# Patient Record
Sex: MALE | Race: WHITE | NOT HISPANIC OR LATINO | Employment: FULL TIME | URBAN - METROPOLITAN AREA
[De-identification: names, ages, dates, MRNs, and addresses within clinical notes are randomized per-mention and may not be internally consistent; named-entity substitution may affect disease eponyms.]

---

## 2017-01-05 ENCOUNTER — ALLSCRIPTS OFFICE VISIT (OUTPATIENT)
Dept: OTHER | Facility: OTHER | Age: 57
End: 2017-01-05

## 2017-01-05 DIAGNOSIS — R97.20 ELEVATED PROSTATE SPECIFIC ANTIGEN (PSA): ICD-10-CM

## 2017-01-05 LAB
BILIRUB UR QL STRIP: NORMAL
CLARITY UR: NORMAL
COLOR UR: YELLOW
GLUCOSE (HISTORICAL): NORMAL
HGB UR QL STRIP.AUTO: NORMAL
KETONES UR STRIP-MCNC: NORMAL MG/DL
LEUKOCYTE ESTERASE UR QL STRIP: NORMAL
NITRITE UR QL STRIP: NORMAL
PH UR STRIP.AUTO: 5 [PH]
PROT UR STRIP-MCNC: NORMAL MG/DL
SP GR UR STRIP.AUTO: 1.02
UROBILINOGEN UR QL STRIP.AUTO: NORMAL

## 2017-02-02 ENCOUNTER — LAB CONVERSION - ENCOUNTER (OUTPATIENT)
Dept: OTHER | Facility: OTHER | Age: 57
End: 2017-02-02

## 2017-02-02 LAB — PROSTATE SPECIFIC ANTIGEN TOTAL (HISTORICAL): 0.8 NG/ML

## 2017-06-09 ENCOUNTER — ALLSCRIPTS OFFICE VISIT (OUTPATIENT)
Dept: OTHER | Facility: OTHER | Age: 57
End: 2017-06-09

## 2017-06-16 ENCOUNTER — ALLSCRIPTS OFFICE VISIT (OUTPATIENT)
Dept: RADIOLOGY | Facility: CLINIC | Age: 57
End: 2017-06-16
Payer: COMMERCIAL

## 2017-06-23 ENCOUNTER — GENERIC CONVERSION - ENCOUNTER (OUTPATIENT)
Dept: OTHER | Facility: OTHER | Age: 57
End: 2017-06-23

## 2017-07-12 ENCOUNTER — ALLSCRIPTS OFFICE VISIT (OUTPATIENT)
Dept: OTHER | Facility: OTHER | Age: 57
End: 2017-07-12

## 2017-07-27 ENCOUNTER — GENERIC CONVERSION - ENCOUNTER (OUTPATIENT)
Dept: OTHER | Facility: OTHER | Age: 57
End: 2017-07-27

## 2017-09-27 ENCOUNTER — GENERIC CONVERSION - ENCOUNTER (OUTPATIENT)
Dept: OTHER | Facility: OTHER | Age: 57
End: 2017-09-27

## 2017-09-28 ENCOUNTER — LAB (OUTPATIENT)
Dept: LAB | Facility: HOSPITAL | Age: 57
End: 2017-09-28
Attending: ORTHOPAEDIC SURGERY
Payer: COMMERCIAL

## 2017-09-28 ENCOUNTER — HOSPITAL ENCOUNTER (OUTPATIENT)
Dept: RADIOLOGY | Facility: HOSPITAL | Age: 57
Discharge: HOME | End: 2017-09-28
Attending: ORTHOPAEDIC SURGERY
Payer: COMMERCIAL

## 2017-09-28 ENCOUNTER — TRANSCRIBE ORDERS (OUTPATIENT)
Dept: RADIOLOGY | Facility: HOSPITAL | Age: 57
End: 2017-09-28

## 2017-09-28 ENCOUNTER — TRANSCRIBE ORDERS (OUTPATIENT)
Dept: LAB | Facility: HOSPITAL | Age: 57
End: 2017-09-28

## 2017-09-28 DIAGNOSIS — M48.061 LUMBAR STENOSIS: ICD-10-CM

## 2017-09-28 DIAGNOSIS — M48.061 LUMBAR STENOSIS: Primary | ICD-10-CM

## 2017-09-28 LAB
ALBUMIN SERPL BCP-MCNC: 3.9 G/DL (ref 3.5–5)
ALP SERPL-CCNC: 77 U/L (ref 46–116)
ALT SERPL W P-5'-P-CCNC: 28 U/L (ref 12–78)
ANION GAP SERPL CALCULATED.3IONS-SCNC: 10 MMOL/L (ref 4–13)
APTT PPP: 29 SECONDS (ref 23–35)
AST SERPL W P-5'-P-CCNC: 25 U/L (ref 5–45)
ATRIAL RATE: 70 BPM
BASOPHILS # BLD AUTO: 0.01 THOUSANDS/ΜL (ref 0–0.1)
BASOPHILS NFR BLD AUTO: 0 % (ref 0–1)
BILIRUB SERPL-MCNC: 0.46 MG/DL (ref 0.2–1)
BILIRUB UR QL STRIP: NEGATIVE
BUN SERPL-MCNC: 17 MG/DL (ref 5–25)
CALCIUM SERPL-MCNC: 8.8 MG/DL (ref 8.3–10.1)
CHLORIDE SERPL-SCNC: 111 MMOL/L (ref 100–108)
CLARITY UR: CLEAR
CO2 SERPL-SCNC: 22 MMOL/L (ref 21–32)
COLOR UR: YELLOW
CREAT SERPL-MCNC: 0.88 MG/DL (ref 0.6–1.3)
EOSINOPHIL # BLD AUTO: 0.1 THOUSAND/ΜL (ref 0–0.61)
EOSINOPHIL NFR BLD AUTO: 1 % (ref 0–6)
ERYTHROCYTE [DISTWIDTH] IN BLOOD BY AUTOMATED COUNT: 12.9 % (ref 11.6–15.1)
GFR SERPL CREATININE-BSD FRML MDRD: 94 ML/MIN/1.73SQ M
GLUCOSE P FAST SERPL-MCNC: 74 MG/DL (ref 65–99)
GLUCOSE UR STRIP-MCNC: NEGATIVE MG/DL
HCT VFR BLD AUTO: 42.9 % (ref 36.5–49.3)
HGB BLD-MCNC: 15.7 G/DL (ref 12–17)
HGB UR QL STRIP.AUTO: NEGATIVE
INR PPP: 1.02 (ref 0.86–1.16)
KETONES UR STRIP-MCNC: NEGATIVE MG/DL
LEUKOCYTE ESTERASE UR QL STRIP: NEGATIVE
LYMPHOCYTES # BLD AUTO: 1.89 THOUSANDS/ΜL (ref 0.6–4.47)
LYMPHOCYTES NFR BLD AUTO: 25 % (ref 14–44)
MCH RBC QN AUTO: 33.8 PG (ref 26.8–34.3)
MCHC RBC AUTO-ENTMCNC: 36.6 G/DL (ref 31.4–37.4)
MCV RBC AUTO: 92 FL (ref 82–98)
MONOCYTES # BLD AUTO: 0.72 THOUSAND/ΜL (ref 0.17–1.22)
MONOCYTES NFR BLD AUTO: 10 % (ref 4–12)
NEUTROPHILS # BLD AUTO: 4.71 THOUSANDS/ΜL (ref 1.85–7.62)
NEUTS SEG NFR BLD AUTO: 64 % (ref 43–75)
NITRITE UR QL STRIP: NEGATIVE
NRBC BLD AUTO-RTO: 0 /100 WBCS
P AXIS: 55 DEGREES
PH UR STRIP.AUTO: 5.5 [PH] (ref 4.5–8)
PLATELET # BLD AUTO: 249 THOUSANDS/UL (ref 149–390)
PMV BLD AUTO: 11 FL (ref 8.9–12.7)
POTASSIUM SERPL-SCNC: 3.9 MMOL/L (ref 3.5–5.3)
PR INTERVAL: 142 MS
PROT SERPL-MCNC: 7.6 G/DL (ref 6.4–8.2)
PROT UR STRIP-MCNC: NEGATIVE MG/DL
PROTHROMBIN TIME: 13.4 SECONDS (ref 12.1–14.4)
QRS AXIS: 67 DEGREES
QRSD INTERVAL: 86 MS
QT INTERVAL: 396 MS
QTC INTERVAL: 427 MS
RBC # BLD AUTO: 4.65 MILLION/UL (ref 3.88–5.62)
SODIUM SERPL-SCNC: 143 MMOL/L (ref 136–145)
SP GR UR STRIP.AUTO: 1.03 (ref 1–1.03)
T WAVE AXIS: 54 DEGREES
UROBILINOGEN UR QL STRIP.AUTO: 0.2 E.U./DL
VENTRICULAR RATE: 70 BPM
WBC # BLD AUTO: 7.44 THOUSAND/UL (ref 4.31–10.16)

## 2017-09-28 PROCEDURE — 81003 URINALYSIS AUTO W/O SCOPE: CPT | Performed by: ORTHOPAEDIC SURGERY

## 2017-09-28 PROCEDURE — 71020 HB CHEST X-RAY 2VW FRONTAL&LATL: CPT

## 2017-09-28 PROCEDURE — 86850 RBC ANTIBODY SCREEN: CPT

## 2017-09-28 PROCEDURE — 80053 COMPREHEN METABOLIC PANEL: CPT

## 2017-09-28 PROCEDURE — 93005 ELECTROCARDIOGRAM TRACING: CPT

## 2017-09-28 PROCEDURE — 86900 BLOOD TYPING SEROLOGIC ABO: CPT

## 2017-09-28 PROCEDURE — 86901 BLOOD TYPING SEROLOGIC RH(D): CPT

## 2017-09-28 PROCEDURE — 85610 PROTHROMBIN TIME: CPT

## 2017-09-28 PROCEDURE — 85730 THROMBOPLASTIN TIME PARTIAL: CPT

## 2017-09-28 PROCEDURE — 36415 COLL VENOUS BLD VENIPUNCTURE: CPT

## 2017-09-28 PROCEDURE — 85025 COMPLETE CBC W/AUTO DIFF WBC: CPT

## 2017-09-29 LAB
ABO GROUP BLD: NORMAL
BLD GP AB SCN SERPL QL: NEGATIVE
RH BLD: POSITIVE
SPECIMEN EXPIRATION DATE: NORMAL

## 2017-10-09 RX ORDER — IBUPROFEN 200 MG
TABLET ORAL DAILY PRN
COMMUNITY
End: 2019-10-31

## 2017-10-09 RX ORDER — VALSARTAN 320 MG/1
320 TABLET ORAL DAILY
COMMUNITY

## 2017-10-09 NOTE — PRE-PROCEDURE INSTRUCTIONS
Pre-Surgery Instructions:   Medication Instructions    ibuprofen (MOTRIN) 200 mg tablet Patient was instructed by Physician and understands   valsartan (DIOVAN) 320 MG tablet Instructed patient per Anesthesia Guidelines  REVIEWED  PRINTED SURGICAL INSTRUCTIONS WITH PATIENT , PATIENT VERBALIZED UNDERSTANDING   MEDICATIONS REVIEWED

## 2017-10-10 ENCOUNTER — APPOINTMENT (OUTPATIENT)
Dept: RADIOLOGY | Facility: HOSPITAL | Age: 57
End: 2017-10-10
Payer: COMMERCIAL

## 2017-10-10 ENCOUNTER — ANESTHESIA EVENT (OUTPATIENT)
Dept: PERIOP | Facility: HOSPITAL | Age: 57
End: 2017-10-10
Payer: COMMERCIAL

## 2017-10-10 ENCOUNTER — HOSPITAL ENCOUNTER (OUTPATIENT)
Facility: HOSPITAL | Age: 57
Discharge: HOME | End: 2017-10-11
Attending: ORTHOPAEDIC SURGERY | Admitting: ORTHOPAEDIC SURGERY
Payer: COMMERCIAL

## 2017-10-10 ENCOUNTER — ANESTHESIA (OUTPATIENT)
Dept: PERIOP | Facility: HOSPITAL | Age: 57
End: 2017-10-10
Payer: COMMERCIAL

## 2017-10-10 DIAGNOSIS — M51.9 LUMBAR DISC DISEASE: Primary | ICD-10-CM

## 2017-10-10 PROCEDURE — 72100 X-RAY EXAM L-S SPINE 2/3 VWS: CPT

## 2017-10-10 PROCEDURE — C1765 ADHESION BARRIER: HCPCS | Performed by: ORTHOPAEDIC SURGERY

## 2017-10-10 RX ORDER — VALSARTAN 160 MG/1
320 TABLET ORAL DAILY
Status: DISCONTINUED | OUTPATIENT
Start: 2017-10-10 | End: 2017-10-11 | Stop reason: HOSPADM

## 2017-10-10 RX ORDER — TRAMADOL HYDROCHLORIDE 50 MG/1
50 TABLET ORAL EVERY 6 HOURS PRN
Status: DISCONTINUED | OUTPATIENT
Start: 2017-10-10 | End: 2017-10-11 | Stop reason: HOSPADM

## 2017-10-10 RX ORDER — ROCURONIUM BROMIDE 10 MG/ML
INJECTION, SOLUTION INTRAVENOUS AS NEEDED
Status: DISCONTINUED | OUTPATIENT
Start: 2017-10-10 | End: 2017-10-10 | Stop reason: SURG

## 2017-10-10 RX ORDER — SODIUM CHLORIDE, SODIUM LACTATE, POTASSIUM CHLORIDE, CALCIUM CHLORIDE 600; 310; 30; 20 MG/100ML; MG/100ML; MG/100ML; MG/100ML
INJECTION, SOLUTION INTRAVENOUS CONTINUOUS PRN
Status: DISCONTINUED | OUTPATIENT
Start: 2017-10-10 | End: 2017-10-10 | Stop reason: SURG

## 2017-10-10 RX ORDER — EPHEDRINE SULFATE 50 MG/ML
INJECTION, SOLUTION INTRAVENOUS AS NEEDED
Status: DISCONTINUED | OUTPATIENT
Start: 2017-10-10 | End: 2017-10-10 | Stop reason: SURG

## 2017-10-10 RX ORDER — TRAMADOL HYDROCHLORIDE 50 MG/1
50 TABLET ORAL EVERY 6 HOURS SCHEDULED
Status: DISCONTINUED | OUTPATIENT
Start: 2017-10-10 | End: 2017-10-10

## 2017-10-10 RX ORDER — METHOCARBAMOL 500 MG/1
500 TABLET, FILM COATED ORAL 4 TIMES DAILY
Qty: 30 TABLET | Refills: 0 | Status: SHIPPED | OUTPATIENT
Start: 2017-10-10 | End: 2019-09-26 | Stop reason: SDUPTHER

## 2017-10-10 RX ORDER — FENTANYL CITRATE 50 UG/ML
INJECTION, SOLUTION INTRAMUSCULAR; INTRAVENOUS AS NEEDED
Status: DISCONTINUED | OUTPATIENT
Start: 2017-10-10 | End: 2017-10-10 | Stop reason: SURG

## 2017-10-10 RX ORDER — LIDOCAINE HYDROCHLORIDE 10 MG/ML
INJECTION, SOLUTION INFILTRATION; PERINEURAL AS NEEDED
Status: DISCONTINUED | OUTPATIENT
Start: 2017-10-10 | End: 2017-10-10 | Stop reason: SURG

## 2017-10-10 RX ORDER — SODIUM CHLORIDE 9 MG/ML
125 INJECTION, SOLUTION INTRAVENOUS CONTINUOUS
Status: DISPENSED | OUTPATIENT
Start: 2017-10-10 | End: 2017-10-11

## 2017-10-10 RX ORDER — ALBUMIN, HUMAN INJ 5% 5 %
SOLUTION INTRAVENOUS CONTINUOUS PRN
Status: DISCONTINUED | OUTPATIENT
Start: 2017-10-10 | End: 2017-10-10 | Stop reason: SURG

## 2017-10-10 RX ORDER — SODIUM CHLORIDE 9 MG/ML
INJECTION, SOLUTION INTRAVENOUS CONTINUOUS PRN
Status: DISCONTINUED | OUTPATIENT
Start: 2017-10-10 | End: 2017-10-10 | Stop reason: SURG

## 2017-10-10 RX ORDER — ONDANSETRON 2 MG/ML
4 INJECTION INTRAMUSCULAR; INTRAVENOUS ONCE
Status: DISCONTINUED | OUTPATIENT
Start: 2017-10-10 | End: 2017-10-10 | Stop reason: HOSPADM

## 2017-10-10 RX ORDER — SULFAMETHOXAZOLE AND TRIMETHOPRIM 800; 160 MG/1; MG/1
1 TABLET ORAL 2 TIMES DAILY
Qty: 10 TABLET | Refills: 0 | Status: SHIPPED | OUTPATIENT
Start: 2017-10-10 | End: 2017-10-15

## 2017-10-10 RX ORDER — MIDAZOLAM HYDROCHLORIDE 1 MG/ML
INJECTION INTRAMUSCULAR; INTRAVENOUS AS NEEDED
Status: DISCONTINUED | OUTPATIENT
Start: 2017-10-10 | End: 2017-10-10 | Stop reason: SURG

## 2017-10-10 RX ORDER — GABAPENTIN 100 MG/1
100 CAPSULE ORAL EVERY 8 HOURS SCHEDULED
Status: DISCONTINUED | OUTPATIENT
Start: 2017-10-10 | End: 2017-10-11 | Stop reason: HOSPADM

## 2017-10-10 RX ORDER — DOCUSATE SODIUM 100 MG/1
100 CAPSULE, LIQUID FILLED ORAL 2 TIMES DAILY
Status: DISCONTINUED | OUTPATIENT
Start: 2017-10-10 | End: 2017-10-11 | Stop reason: HOSPADM

## 2017-10-10 RX ORDER — CHLORHEXIDINE GLUCONATE 0.12 MG/ML
15 RINSE ORAL ONCE
Status: COMPLETED | OUTPATIENT
Start: 2017-10-10 | End: 2017-10-10

## 2017-10-10 RX ORDER — OXYCODONE HYDROCHLORIDE 5 MG/1
5 TABLET ORAL EVERY 4 HOURS PRN
Qty: 30 TABLET | Refills: 0 | Status: SHIPPED | OUTPATIENT
Start: 2017-10-10 | End: 2017-10-20

## 2017-10-10 RX ORDER — ONDANSETRON 2 MG/ML
4 INJECTION INTRAMUSCULAR; INTRAVENOUS EVERY 6 HOURS PRN
Status: DISCONTINUED | OUTPATIENT
Start: 2017-10-10 | End: 2017-10-11 | Stop reason: HOSPADM

## 2017-10-10 RX ORDER — BUPIVACAINE HYDROCHLORIDE 2.5 MG/ML
INJECTION, SOLUTION INFILTRATION; PERINEURAL AS NEEDED
Status: DISCONTINUED | OUTPATIENT
Start: 2017-10-10 | End: 2017-10-10 | Stop reason: HOSPADM

## 2017-10-10 RX ORDER — ONDANSETRON 2 MG/ML
INJECTION INTRAMUSCULAR; INTRAVENOUS AS NEEDED
Status: DISCONTINUED | OUTPATIENT
Start: 2017-10-10 | End: 2017-10-10 | Stop reason: SURG

## 2017-10-10 RX ORDER — ACETAMINOPHEN 325 MG/1
650 TABLET ORAL EVERY 6 HOURS PRN
Status: DISCONTINUED | OUTPATIENT
Start: 2017-10-10 | End: 2017-10-11 | Stop reason: HOSPADM

## 2017-10-10 RX ORDER — GLYCOPYRROLATE 0.2 MG/ML
INJECTION INTRAMUSCULAR; INTRAVENOUS AS NEEDED
Status: DISCONTINUED | OUTPATIENT
Start: 2017-10-10 | End: 2017-10-10 | Stop reason: SURG

## 2017-10-10 RX ORDER — OXYCODONE HYDROCHLORIDE 5 MG/1
5 TABLET ORAL EVERY 4 HOURS PRN
Status: DISCONTINUED | OUTPATIENT
Start: 2017-10-10 | End: 2017-10-11 | Stop reason: HOSPADM

## 2017-10-10 RX ORDER — PROPOFOL 10 MG/ML
INJECTION, EMULSION INTRAVENOUS AS NEEDED
Status: DISCONTINUED | OUTPATIENT
Start: 2017-10-10 | End: 2017-10-10 | Stop reason: SURG

## 2017-10-10 RX ADMIN — ALBUMIN HUMAN: 0.05 INJECTION, SOLUTION INTRAVENOUS at 08:50

## 2017-10-10 RX ADMIN — LIDOCAINE HYDROCHLORIDE 100 MG: 10 INJECTION, SOLUTION INFILTRATION; PERINEURAL at 07:51

## 2017-10-10 RX ADMIN — EPHEDRINE SULFATE 10 MG: 50 INJECTION, SOLUTION INTRAMUSCULAR; INTRAVENOUS; SUBCUTANEOUS at 08:20

## 2017-10-10 RX ADMIN — HYDROMORPHONE HYDROCHLORIDE 0.2 MG: 1 INJECTION, SOLUTION INTRAMUSCULAR; INTRAVENOUS; SUBCUTANEOUS at 10:40

## 2017-10-10 RX ADMIN — KETAMINE HYDROCHLORIDE 1 MG/KG/HR: 50 INJECTION, SOLUTION INTRAMUSCULAR; INTRAVENOUS at 08:00

## 2017-10-10 RX ADMIN — PROPOFOL 200 MG: 10 INJECTION, EMULSION INTRAVENOUS at 07:51

## 2017-10-10 RX ADMIN — EPHEDRINE SULFATE 10 MG: 50 INJECTION, SOLUTION INTRAMUSCULAR; INTRAVENOUS; SUBCUTANEOUS at 08:09

## 2017-10-10 RX ADMIN — DEXAMETHASONE SODIUM PHOSPHATE 10 MG: 10 INJECTION INTRAMUSCULAR; INTRAVENOUS at 07:51

## 2017-10-10 RX ADMIN — EPHEDRINE SULFATE 10 MG: 50 INJECTION, SOLUTION INTRAMUSCULAR; INTRAVENOUS; SUBCUTANEOUS at 09:05

## 2017-10-10 RX ADMIN — HYDROMORPHONE HYDROCHLORIDE 0.2 MG: 1 INJECTION, SOLUTION INTRAMUSCULAR; INTRAVENOUS; SUBCUTANEOUS at 11:00

## 2017-10-10 RX ADMIN — GLYCOPYRROLATE 0.1 MG: 0.2 INJECTION, SOLUTION INTRAMUSCULAR; INTRAVENOUS at 07:42

## 2017-10-10 RX ADMIN — DOCUSATE SODIUM 100 MG: 100 CAPSULE, LIQUID FILLED ORAL at 18:01

## 2017-10-10 RX ADMIN — HYDROMORPHONE HYDROCHLORIDE 0.2 MG: 1 INJECTION, SOLUTION INTRAMUSCULAR; INTRAVENOUS; SUBCUTANEOUS at 11:10

## 2017-10-10 RX ADMIN — VALSARTAN 320 MG: 160 TABLET ORAL at 13:37

## 2017-10-10 RX ADMIN — DEXMEDETOMIDINE HYDROCHLORIDE 1 MCG/KG/HR: 100 INJECTION, SOLUTION INTRAVENOUS at 08:00

## 2017-10-10 RX ADMIN — SODIUM CHLORIDE: 0.9 INJECTION, SOLUTION INTRAVENOUS at 08:44

## 2017-10-10 RX ADMIN — HYDROMORPHONE HYDROCHLORIDE 0.2 MG: 1 INJECTION, SOLUTION INTRAMUSCULAR; INTRAVENOUS; SUBCUTANEOUS at 10:45

## 2017-10-10 RX ADMIN — HYDROMORPHONE HYDROCHLORIDE 0.2 MG: 1 INJECTION, SOLUTION INTRAMUSCULAR; INTRAVENOUS; SUBCUTANEOUS at 11:05

## 2017-10-10 RX ADMIN — FENTANYL CITRATE 100 MCG: 50 INJECTION, SOLUTION INTRAMUSCULAR; INTRAVENOUS at 07:51

## 2017-10-10 RX ADMIN — HYDROMORPHONE HYDROCHLORIDE 0.5 MG: 1 INJECTION, SOLUTION INTRAMUSCULAR; INTRAVENOUS; SUBCUTANEOUS at 11:46

## 2017-10-10 RX ADMIN — NEOSTIGMINE METHYLSULFATE 3 MG: 1 INJECTION, SOLUTION INTRAMUSCULAR; INTRAVENOUS; SUBCUTANEOUS at 09:59

## 2017-10-10 RX ADMIN — PHENYLEPHRINE HYDROCHLORIDE 10 MCG/MIN: 10 INJECTION INTRAVENOUS at 09:27

## 2017-10-10 RX ADMIN — CEFAZOLIN SODIUM 2000 MG: 2 SOLUTION INTRAVENOUS at 08:01

## 2017-10-10 RX ADMIN — GABAPENTIN 100 MG: 100 CAPSULE ORAL at 13:37

## 2017-10-10 RX ADMIN — ONDANSETRON 4 MG: 2 INJECTION INTRAMUSCULAR; INTRAVENOUS at 09:59

## 2017-10-10 RX ADMIN — ALBUMIN HUMAN: 0.05 INJECTION, SOLUTION INTRAVENOUS at 08:38

## 2017-10-10 RX ADMIN — TRAMADOL HYDROCHLORIDE 50 MG: 50 TABLET, FILM COATED ORAL at 22:25

## 2017-10-10 RX ADMIN — HYDROMORPHONE HYDROCHLORIDE 0.2 MG: 1 INJECTION, SOLUTION INTRAMUSCULAR; INTRAVENOUS; SUBCUTANEOUS at 11:25

## 2017-10-10 RX ADMIN — HYDROMORPHONE HYDROCHLORIDE 0.2 MG: 1 INJECTION, SOLUTION INTRAMUSCULAR; INTRAVENOUS; SUBCUTANEOUS at 11:15

## 2017-10-10 RX ADMIN — ROCURONIUM BROMIDE 50 MG: 10 INJECTION, SOLUTION INTRAVENOUS at 07:51

## 2017-10-10 RX ADMIN — HYDROMORPHONE HYDROCHLORIDE 0.2 MG: 1 INJECTION, SOLUTION INTRAMUSCULAR; INTRAVENOUS; SUBCUTANEOUS at 11:20

## 2017-10-10 RX ADMIN — HYDROMORPHONE HYDROCHLORIDE 0.5 MG: 1 INJECTION, SOLUTION INTRAMUSCULAR; INTRAVENOUS; SUBCUTANEOUS at 11:56

## 2017-10-10 RX ADMIN — TRAMADOL HYDROCHLORIDE 50 MG: 50 TABLET, FILM COATED ORAL at 13:37

## 2017-10-10 RX ADMIN — CEFAZOLIN SODIUM 2000 MG: 2 SOLUTION INTRAVENOUS at 18:01

## 2017-10-10 RX ADMIN — SODIUM CHLORIDE 125 ML/HR: 0.9 INJECTION, SOLUTION INTRAVENOUS at 12:21

## 2017-10-10 RX ADMIN — HYDROMORPHONE HYDROCHLORIDE 0.2 MG: 1 INJECTION, SOLUTION INTRAMUSCULAR; INTRAVENOUS; SUBCUTANEOUS at 10:50

## 2017-10-10 RX ADMIN — OXYCODONE HYDROCHLORIDE 5 MG: 5 TABLET ORAL at 18:14

## 2017-10-10 RX ADMIN — ONDANSETRON 4 MG: 2 INJECTION INTRAMUSCULAR; INTRAVENOUS at 07:42

## 2017-10-10 RX ADMIN — MIDAZOLAM HYDROCHLORIDE 2 MG: 1 INJECTION, SOLUTION INTRAMUSCULAR; INTRAVENOUS at 07:42

## 2017-10-10 RX ADMIN — EPHEDRINE SULFATE 10 MG: 50 INJECTION, SOLUTION INTRAMUSCULAR; INTRAVENOUS; SUBCUTANEOUS at 08:54

## 2017-10-10 RX ADMIN — GLYCOPYRROLATE 0.4 MG: 0.2 INJECTION, SOLUTION INTRAMUSCULAR; INTRAVENOUS at 09:59

## 2017-10-10 RX ADMIN — EPHEDRINE SULFATE 10 MG: 50 INJECTION, SOLUTION INTRAMUSCULAR; INTRAVENOUS; SUBCUTANEOUS at 08:06

## 2017-10-10 RX ADMIN — HYDROMORPHONE HYDROCHLORIDE 0.2 MG: 1 INJECTION, SOLUTION INTRAMUSCULAR; INTRAVENOUS; SUBCUTANEOUS at 10:55

## 2017-10-10 RX ADMIN — SODIUM CHLORIDE, SODIUM LACTATE, POTASSIUM CHLORIDE, AND CALCIUM CHLORIDE: .6; .31; .03; .02 INJECTION, SOLUTION INTRAVENOUS at 07:30

## 2017-10-10 RX ADMIN — CEFAZOLIN SODIUM 2000 MG: 2 SOLUTION INTRAVENOUS at 23:59

## 2017-10-10 RX ADMIN — GABAPENTIN 100 MG: 100 CAPSULE ORAL at 22:25

## 2017-10-10 RX ADMIN — SODIUM CHLORIDE 125 ML/HR: 0.9 INJECTION, SOLUTION INTRAVENOUS at 21:29

## 2017-10-10 RX ADMIN — CHLORHEXIDINE GLUCONATE 15 ML: 1.2 RINSE ORAL at 06:37

## 2017-10-10 NOTE — ANESTHESIA POSTPROCEDURE EVALUATION
Post-Op Assessment Note      CV Status:  Stable    Mental Status:  Alert and awake    Hydration Status:  Euvolemic    PONV Controlled:  None    Airway Patency:  Patent    Post Op Vitals Reviewed: Yes          Staff: CRNA, Anesthesiologist       Comments: Pt  to Pacu  Report given  Course uneventful  VSS  Fully AAO x3  Neuro, intacr  Vision intact            BP      Temp      Pulse     Resp      SpO2

## 2017-10-10 NOTE — SOCIAL WORK
CM met with the Pt at bedside to explain the CM role and discuss any D/C needs  Pt lives with his wife in a Bilevel home with 4STE  PTA IADL's, No DME  No hx of HHC, IP rehab or MH diagnosis  Pt reports hx of OutPt PT  Pt's home [facility] is [facility] [address] would prefer to use [facility] at D/C  Pt's wife can provide transportation and assistance at D/C  CM reviewed d/c planning process including the following: identifying help at home, patient preference for d/c planning needs, Discharge Lounge, [facility] Meds to Bed program, availability of treatment team to discuss questions or concerns patient and/or family may have regarding understanding medications and recognizing signs and symptoms once discharged  CM also encouraged patient to follow up with all recommended appointments after discharge  Patient advised of importance for patient and family to participate in managing patients medical well being

## 2017-10-10 NOTE — OP NOTE
OPERATIVE REPORT  PATIENT NAME: Armando Chaparro    :  1960  MRN: [id number]  Pt Location: [facility] OR ROOM 18    SURGERY DATE: 10/10/2017    Surgeon(s) and Role:     * Jer Guerra MD - Primary     * Fifi Canales MD - [address], RADHA - Assisting    Preop Diagnosis:  Other intervertebral disc degeneration, lumbar region [M51 36]  Radiculopathy of lumbar region [M54 16]    Post-Op Diagnosis Codes:     * Other intervertebral disc degeneration, lumbar region [M51 36]     * Radiculopathy of lumbar region [M54 16]    Procedure(s) (LRB):  LUMBAR LAMINECTOMY, DECOMPRESSION L3-4,L4-5 (N/A)    Specimen(s):  * No specimens in log *    Estimated Blood Loss:   450 mL    Drains:  Closed/Suction Drain Right Back Bulb 10 Fr  (Active)   Dressing Status Clean;Dry; Intact 10/10/2017  9:43 AM   Number of days: 0       Anesthesia Type:   General    Operative Indications: Other intervertebral disc degeneration, lumbar region [M51 36]  Radiculopathy of lumbar region [M54 16]  Lumbar spinal stenosis L3-4, L4-5    Operative Findings:  Severe lateral recess stenosis left L3-4, L4-5   Degenerative facet joints C6-3, J7-2    Complications:   None    Procedure and Technique:  Lumbar laminectomy decompression L3-4, L4-5, bilateral foraminotomies L3-4 and L4-5  Patient was correctly identified by both the anesthesia department and myself  His back was marked  He was taken to the operating room where general anesthesia was induced in the supine position  2 g of Ancef were administered for preoperative antibiotics  SCDs were attached his legs  He was flipped prone onto a radiolucent table ensuring that all bony prominences and neurovascular structures were adequately padded and protected  AP and lateral imaging confirmed that the incision would be made overlying L3-L5  The back was then prepped and draped in the usual sterile fashion  A time-out was performed   A 4 cm longitudinal incision was made in the midline overlying the L3-L5  Dissection was carried down to the level of the fascia  Subperiosteal dissection was performed with the Bovie electrocautery exposing the spinous processes and lamina bilaterally from L3-L5  Self-retaining retractors were placed  A lateral image confirmed the appropriate level of L4  Leksell rongeur was used to remove the spinous processes at L3-L4 and L5  The lamina at L3-L4 and L5 were thinned with a high-speed bur  Laminectomy was then performed with Kerrison rongeur is 3  And 4  Decompressing the central thecal sac  Medial facetectomies were performed bilaterally at L3-4 and L4-5  Foraminotomies were then performed  Kerrison rongeurs decompressing the exiting roots at L3-4 and L4-5  Following adequate decompression hemostasis was obtained  The wound was copiously irrigated with normal saline solution  DuraSeal was applied over the thecal sac which was intact  A deep 10 Turkmen Hemovac drain was placed deep to the fascia  The fascia was then closed in a watertight fashion using 0 PDS suture  The subcutaneous layer was closed using 2 Vicryl suture  The skin was reapproximated using 3 nylon suture  The drain was sutured in place using 3 nylon suture and attached to suction  Marcaine was injected into the soft tissues  Sterile dressings were applied to the wound  The patient was flipped into the supine position extubated and then taken out of the operating room in stable condition     I was present for the entire procedure    Patient Disposition:  PACU          SIGNATURE: Debbie Betancourt MD  DATE: October 10, 2017  TIME: 10:03 AM

## 2017-10-10 NOTE — PLAN OF CARE

## 2017-10-10 NOTE — ANESTHESIA PREPROCEDURE EVALUATION
Review of Systems/Medical History      No history of anesthetic complications     Cardiovascular  EKG reviewed, Exercise tolerance: good,  Hypertension controlled,    Pulmonary  Negative pulmonary ROS ,        GI/Hepatic  Negative GI/hepatic ROS          Negative  ROS        Endo/Other  Negative endo/other ROS      GYN       Hematology  Negative hematology ROS      Musculoskeletal  Back pain , lumbar pain,   Comment: Neuropathy in left leg      Neurology   Psychology           Physical Exam    Airway    Mallampati score: II  TM Distance: >3 FB  Neck ROM: full     Dental       Cardiovascular  Cardiovascular exam normal    Pulmonary  Pulmonary exam normal Breath sounds clear to auscultation,     Other Findings        Anesthesia Plan  ASA Score- 2       Anesthesia Type- general        Induction- intravenous      Informed Consent  Anesthetic plan and risks discussed with patient and spouse

## 2017-10-11 VITALS
OXYGEN SATURATION: 97 % | RESPIRATION RATE: 20 BRPM | HEART RATE: 83 BPM | TEMPERATURE: 97.9 F | BODY MASS INDEX: 24.93 KG/M2 | HEIGHT: 70 IN | DIASTOLIC BLOOD PRESSURE: 84 MMHG | SYSTOLIC BLOOD PRESSURE: 155 MMHG | WEIGHT: 174.16 LBS

## 2017-10-11 LAB
ANION GAP SERPL CALCULATED.3IONS-SCNC: 8 MMOL/L (ref 4–13)
BASOPHILS # BLD AUTO: 0 THOUSANDS/ΜL (ref 0–0.1)
BASOPHILS NFR BLD AUTO: 0 % (ref 0–1)
BUN SERPL-MCNC: 10 MG/DL (ref 5–25)
CALCIUM SERPL-MCNC: 8.4 MG/DL (ref 8.3–10.1)
CHLORIDE SERPL-SCNC: 108 MMOL/L (ref 100–108)
CO2 SERPL-SCNC: 25 MMOL/L (ref 21–32)
CREAT SERPL-MCNC: 0.92 MG/DL (ref 0.6–1.3)
EOSINOPHIL # BLD AUTO: 0 THOUSAND/ΜL (ref 0–0.61)
EOSINOPHIL NFR BLD AUTO: 0 % (ref 0–6)
ERYTHROCYTE [DISTWIDTH] IN BLOOD BY AUTOMATED COUNT: 12.6 % (ref 11.6–15.1)
GFR SERPL CREATININE-BSD FRML MDRD: 91 ML/MIN/1.73SQ M
GLUCOSE SERPL-MCNC: 108 MG/DL (ref 65–140)
HCT VFR BLD AUTO: 35.6 % (ref 36.5–49.3)
HGB BLD-MCNC: 12.8 G/DL (ref 12–17)
LYMPHOCYTES # BLD AUTO: 1.25 THOUSANDS/ΜL (ref 0.6–4.47)
LYMPHOCYTES NFR BLD AUTO: 9 % (ref 14–44)
MCH RBC QN AUTO: 33.3 PG (ref 26.8–34.3)
MCHC RBC AUTO-ENTMCNC: 36 G/DL (ref 31.4–37.4)
MCV RBC AUTO: 93 FL (ref 82–98)
MONOCYTES # BLD AUTO: 1.47 THOUSAND/ΜL (ref 0.17–1.22)
MONOCYTES NFR BLD AUTO: 10 % (ref 4–12)
NEUTROPHILS # BLD AUTO: 11.52 THOUSANDS/ΜL (ref 1.85–7.62)
NEUTS SEG NFR BLD AUTO: 81 % (ref 43–75)
NRBC BLD AUTO-RTO: 0 /100 WBCS
PLATELET # BLD AUTO: 244 THOUSANDS/UL (ref 149–390)
PMV BLD AUTO: 10.3 FL (ref 8.9–12.7)
POTASSIUM SERPL-SCNC: 3.9 MMOL/L (ref 3.5–5.3)
RBC # BLD AUTO: 3.84 MILLION/UL (ref 3.88–5.62)
SODIUM SERPL-SCNC: 141 MMOL/L (ref 136–145)
WBC # BLD AUTO: 14.29 THOUSAND/UL (ref 4.31–10.16)

## 2017-10-11 PROCEDURE — 97162 PT EVAL MOD COMPLEX 30 MIN: CPT

## 2017-10-11 PROCEDURE — G8978 MOBILITY CURRENT STATUS: HCPCS

## 2017-10-11 PROCEDURE — 80048 BASIC METABOLIC PNL TOTAL CA: CPT | Performed by: PHYSICIAN ASSISTANT

## 2017-10-11 PROCEDURE — 85025 COMPLETE CBC W/AUTO DIFF WBC: CPT | Performed by: PHYSICIAN ASSISTANT

## 2017-10-11 PROCEDURE — G8979 MOBILITY GOAL STATUS: HCPCS

## 2017-10-11 PROCEDURE — G8980 MOBILITY D/C STATUS: HCPCS

## 2017-10-11 RX ORDER — ACETAMINOPHEN 325 MG/1
TABLET ORAL
Qty: 30 TABLET | Refills: 0 | Status: SHIPPED | OUTPATIENT
Start: 2017-10-11 | End: 2019-10-31

## 2017-10-11 RX ADMIN — OXYCODONE HYDROCHLORIDE 5 MG: 5 TABLET ORAL at 05:30

## 2017-10-11 RX ADMIN — DOCUSATE SODIUM 100 MG: 100 CAPSULE, LIQUID FILLED ORAL at 08:30

## 2017-10-11 RX ADMIN — GABAPENTIN 100 MG: 100 CAPSULE ORAL at 05:30

## 2017-10-11 RX ADMIN — VALSARTAN 320 MG: 160 TABLET ORAL at 08:27

## 2017-10-11 RX ADMIN — SODIUM CHLORIDE 125 ML/HR: 0.9 INJECTION, SOLUTION INTRAVENOUS at 05:52

## 2017-10-11 RX ADMIN — OXYCODONE HYDROCHLORIDE 5 MG: 5 TABLET ORAL at 10:18

## 2017-10-11 NOTE — DISCHARGE INSTRUCTIONS
Discharge Instruction - Jose Jasso 59 y o  male MRN: [id number]  Unit/Bed#: Operating Room    Weight Bearing Status:                                           Full weight bearing all extremities    DVT prophylaxis:  DO NOT take blood thinners until cleared by surgeon    Pain:  Continue analgesics as directed    Showering Instructions:   Do not shower until 5 days after the procedure  Dressing Instructions:   Keep surgical incision clean and dry at all times  No soaking or scrubbing of wound at any time  May change dressing in 5 days, apply dry bandage  Driving Instructions:  No driving until cleared by Orthopaedic Surgery  PT/OT:  No PT/OT required until directed by surgeon at followup appointment    Appt Instructions: If you do not have your appointment, please call the clinic at [phone]  Otherwise followup as scheduled below: Follow-up with Dr Carolina Pardo in 2 weeks ([place] or [address] [place] [address])     Contact the office sooner if you experience any increased numbness/tingling in the extremities  Miscellaneous:  No lifting greater than 5 lbs  No strenuous exercise  No bending/twisting

## 2017-10-11 NOTE — SOCIAL WORK
CM met with the Pt at bedside to discuss any D/c needs  Pt's wife will provide transportation home  Pt would prefer to have medications filled at [facility], prescriptions tubed to be delivered to bedside  Pt has no DME needs  No further needs identified at this time

## 2017-10-11 NOTE — CASE MANAGEMENT
Initial Clinical Review    Age/Sex: 59 y o  male    Surgery Date: 10/10/2017    Procedure:   LUMBAR LAMINECTOMY, DECOMPRESSION L3-4,L4-5 (N/A)  EBL  400    Procedure and Technique:  Lumbar laminectomy decompression L3-4, L4-5, bilateral foraminotomies L3-4 and L4-5  Patient was correctly identified by both the anesthesia department and myself  His back was marked  He was taken to the operating room where general anesthesia was induced in the supine position  2 g of Ancef were administered for preoperative antibiotics  SCDs were attached his legs  He was flipped prone onto a radiolucent table ensuring that all bony prominences and neurovascular structures were adequately padded and protected  AP and lateral imaging confirmed that the incision would be made overlying L3-L5  The back was then prepped and draped in the usual sterile fashion  A time-out was performed  A 4 cm longitudinal incision was made in the midline overlying the L3-L5  Dissection was carried down to the level of the fascia  Subperiosteal dissection was performed with the Bovie electrocautery exposing the spinous processes and lamina bilaterally from L3-L5  Self-retaining retractors were placed  A lateral image confirmed the appropriate level of L4  Leksell rongeur was used to remove the spinous processes at L3-L4 and L5  The lamina at L3-L4 and L5 were thinned with a high-speed bur  Laminectomy was then performed with Kerrison rongeur is 3  And 4  Decompressing the central thecal sac  Medial facetectomies were performed bilaterally at L3-4 and L4-5  Foraminotomies were then performed  Kerrison rongeurs decompressing the exiting roots at L3-4 and L4-5  Following adequate decompression hemostasis was obtained  The wound was copiously irrigated with normal saline solution  DuraSeal was applied over the thecal sac which was intact  A deep 10 Khmer Hemovac drain was placed deep to the fascia    The fascia was then closed in a watertight fashion using 0 PDS suture  The subcutaneous layer was closed using 2 Vicryl suture  The skin was reapproximated using 3 nylon suture  The drain was sutured in place using 3 nylon suture and attached to suction  Marcaine was injected into the soft tissues  Sterile dressings were applied to the wound  The patient was flipped into the supine position extubated and then taken out of the operating room in stable condition  Operative Findings:  Severe lateral recess stenosis left L3-4, L4-5   Degenerative facet joints L3-4, L4-5     Post-Op Diagnosis Codes:     * Other intervertebral disc degeneration, lumbar region [M51 36]     * Radiculopathy of lumbar region [M54 16]     Anesthesia: general     Admission Orders: Date/Time/Statement:     Orders Placed This Encounter   Procedures    Place in Observation     Standing Status:   Standing     Number of Occurrences:   1     Order Specific Question:   Admitting Physician     Answer:   Yue Cespedes [[id number]]     Order Specific Question:   Level of Care     Answer:   Med Surg [16]       Vital Signs: /77   Pulse 97   Temp 98 3 °F (36 8 °C) (Oral)   Resp 20   Ht 5' 10" (1 778 m)   Wt 79 kg (174 lb 2 6 oz)   SpO2 97%   BMI 24 99 kg/m²     Diet:        Diet Orders            Start     Ordered    10/11/17 0736  Room Service  Once     Question:  Type of Service  Answer:  Room Service-Appropriate    10/11/17 0735    10/10/17 1304  Diet Regular; Regular House  Diet effective now     Question Answer Comment   Diet Type Regular    Regular Regular House    RD to adjust diet per protocol?  Yes        10/10/17 1303          Mobility: ambulate    Neuro/ Neurovascular checks q 4 hrs  IS q 1 hr while awake   PT/OT  hemovac empty and drain q shift  Sequential compression device   Cefazolin IV q 8   neurontin  Tramadol po  diovan po daily   IVF @ 125  (dc'ed 10/11)   jose po prn (given x2)     Pain Control:   Pain Medications             ibuprofen (MOTRIN) 200 mg tablet Take by mouth daily as needed for mild pain (HAS NOT TAKEN IN PAST WEEK)    methocarbamol (ROBAXIN) 500 mg tablet Take 1 tablet by mouth 4 (four) times a day    oxyCODONE (ROXICODONE) 5 mg immediate release tablet Take 1 tablet by mouth every 4 (four) hours as needed for moderate pain or severe pain for up to 10 days Max Daily Amount: 30 mg            10/11/2017   POD 1  Pre op H&H:  15 7 & 42 9  Post op H&H:  12 8 & 35 6    Assessment:  57 y o male post operative day 1 lumbar  laminectomy/decompression L3 to  L5  Doing well     Plan:  · Weight Bearing as tolerated all extremities  · Up and out of bed  · New dressings applied this morning  · Will D/C drain today  · DVT prphylaxis- mechanical  · Analgesics  · PT/OT  · Discharge today  · Will continue to assess for acute blood loss anemia    Hospital Course: The patient was admitted to the hospital on 10/10/2017 and underwent an uncomplicated LUMBAR LAMINECTOMY, DECOMPRESSION L3-4,L4-5  They were transferred to the floor after a brief stay in the post-anesthesia care unit  Their pain was well managed with IV and oral pain medications   A drain which was placed intra-operatively was pulled POD 1

## 2017-10-11 NOTE — DISCHARGE SUMMARY
ORTHOPEDICS DISCHARGE SUMMARY  Teresa Khanna 59 y o  male MRN: [id number]  Unit/Bed#: -01    Attending Physician: Juan Recinos    Admitting diagnosis: Other intervertebral disc degeneration, lumbar region [M51 36]  Radiculopathy of lumbar region [M54 16]    Discharge diagnosis: Other intervertebral disc degeneration, lumbar region [M51 36]  Radiculopathy of lumbar region [M54 16]    Date of admission: 10/10/2017    Date of discharge: 10/11/17    Procedure: LUMBAR LAMINECTOMY, DECOMPRESSION L3-4,L4-5    HPI:  This is a 59y o  year old male that presented to the office with signs and symptoms of lumbar disc degeneration with radiculopathy   They tried and failed conservative treatment measures and wished to proceed with surgical intervention  The risks, benefits, and complications of the procedure were discussed with the patient and informed consent was obtained  Hospital Course: The patient was admitted to the hospital on 10/10/2017 and underwent an uncomplicated LUMBAR LAMINECTOMY, DECOMPRESSION L3-4,L4-5  They were transferred to the floor after a brief stay in the post-anesthesia care unit  Their pain was well managed with IV and oral pain medications  A drain which was placed intra-operatively was pulled POD 1  Daily discussion was had with the patient, nursing staff, orthopaedic team, and family members if present  All questions were answered to the patients satisifaction  0  Lab Value Date/Time   HGB 12 8 10/11/2017 0539   HGB 15 7 09/28/2017 1346       Discharge Instructions: The patient was discharged weight bearing as tolerated to all extremities  Take pain medications as instructed  Discharge Medications: For the complete list of discharge medications, please refer to the patient's medication reconciliation

## 2017-10-11 NOTE — NURSING NOTE
Discharge instructions reviewed with patient including med review and incision care  Patient resides with wife  Refused walker and commode but papers given in case he changes mind  Pain managed with Oxy 5mg

## 2017-10-11 NOTE — PROGRESS NOTES
Orthopedics   Renata Gtz 59 y o  male MRN: [id number]  Unit/Bed#: -01        Subjective:  59 y o male post operative day 1 lumbar laminectomy/decompression L3 to  L5  Pt doing well  Pain controlled  Labs:    0  Lab Value Date/Time   HCT 35 6 (L) 10/11/2017 0539   HCT 42 9 09/28/2017 1346   HGB 12 8 10/11/2017 0539   HGB 15 7 09/28/2017 1346   INR 1 02 09/28/2017 1346   WBC 14 29 (H) 10/11/2017 0539   WBC 7 44 09/28/2017 1346       Meds:    Current Facility-Administered Medications:     acetaminophen (TYLENOL) tablet 650 mg, 650 mg, Oral, Q6H PRN, Ghislaine Calderon PA-C    docusate sodium (COLACE) capsule 100 mg, 100 mg, Oral, BID, Ghislaine Calderon PA-C, 100 mg at 10/10/17 1801    gabapentin (NEURONTIN) capsule 100 mg, 100 mg, Oral, Q8H [address], Ghislaine Calderon PA-C, 100 mg at 10/11/17 0530    ondansetron (ZOFRAN) injection 4 mg, 4 mg, Intravenous, Q6H PRN, Ghislaine Calderon PA-C    oxyCODONE (ROXICODONE) IR tablet 5 mg, 5 mg, Oral, Q4H PRN, Ghislaine Calderon PA-C, 5 mg at 10/11/17 0530    sodium chloride 0 9 % infusion, 125 mL/hr, Intravenous, Continuous, Ghislaine Calderon PA-C, Last Rate: 125 mL/hr at 10/11/17 0552, 125 mL/hr at 10/11/17 0552    traMADol (ULTRAM) tablet 50 mg, 50 mg, Oral, Q6H PRN, Ghislaine Calderon PA-C, 50 mg at 10/10/17 2225    valsartan (DIOVAN) tablet 320 mg, 320 mg, Oral, Daily, Ghislaine Calderon PA-C, 320 mg at 10/10/17 1337    Blood Culture:   No results found for: BLOODCX    Wound Culture:   No results found for: WOUNDCULT    Ins and Outs:  I/O last 24 hours: In: 3523 [P O :100;  I V :4000; IV Piggyback:550]  Out: 4178 [Urine:2475; Drains:260; Blood:450]          Physical:  Vitals:    10/11/17 0714   BP: 137/77   Pulse: 97   Resp: 20   Temp: 98 3 °F (36 8 °C)   SpO2: 97%     Lumbar spine:  · Dressings falling off  · Incision well approximated  · HV in place  · Neurovascularly intact L1-S1 bilateral lower extremities  · Motor intact from L3-S1  · 2+ DP pulse bilateral lower extremities      _*_*_*_*_*_*_*_*_*_*_*_*_*_*_*_*_*_*_*_*_*_*_*_*_*_*_*_*_*_*_*_*_*_*_*_*_*_*_*_*_*    Assessment:  57 y o male post operative day 1 lumbar  laminectomy/decompression L3 to  L5   Doing well    Plan:  · Weight Bearing as tolerated all extremities  · Up and out of bed  · New dressings applied this morning  · Will D/C drain today  · DVT prphylaxis- mechanical  · Analgesics  · PT/OT  · Discharge today  · Will continue to assess for acute blood loss anemia      Rudy Davis MD

## 2017-10-11 NOTE — OCCUPATIONAL THERAPY NOTE
Occupational Therapy Screen Note    Orders received, Chart review completed, RN notified  Pt currently at this time does not demonstrate the need for Occupational Therapy services  Pt is set for D/C today, and was cleared independent from Physical Therapy  All Needs are met at this time and Patient is safe to D/C home with current support and or assistance already in place  The patient does not report any further questions regarding current ADL and functional transfer status  D/C OT services

## 2017-10-11 NOTE — PHYSICAL THERAPY NOTE
Physical Therapy EVAL    Patient Name: Alayna Fuentes    PUWKK'I Date: 10/11/2017     Problem List  Patient Active Problem List   Diagnosis    Lumbar disc disease        Past Medical History  Past Medical History:   Diagnosis Date    Chronic pain     Hypertension     Lumbar disc disease         Past Surgical History  Past Surgical History:   Procedure Laterality Date    APPENDECTOMY      HAND SURGERY Bilateral     RIGHT KNUCKLE REPLACED,  LEFT TRIGGER FINGER    RECTAL SURGERY             10/11/17 0849   Note Type   Note type Eval only   Pain Assessment   Pain Assessment 0-10   Pain Score 2   Pain Type Surgical pain   Pain Location Back   Pain Descriptors Aching   Pain Frequency Constant/continuous   Hospital Pain Intervention(s) Medication (See MAR)   Response to Interventions participated well   Home Living   Type of [facility] Multi-level   Prior Function   Level of [place] Independent with ADLs and functional mobility   Lives With Spouse   Falls in the last 6 months 0   Restrictions/Precautions   Other Precautions Spinal precautions   Cognition   Overall Cognitive Status WFL   RUE Assessment   RUE Assessment WFL   LUE Assessment   LUE Assessment WFL   RLE Assessment   RLE Assessment WFL   LLE Assessment   LLE Assessment WFL   Light Touch   RLE Light Touch Grossly intact   LLE Light Touch Grossly intact   Sharp/Dull   RLE Sharp/Dull Grossly intact   LLE Sharp/Dull Grossly intact   Proprioception   RLE Proprioception Grossly intact   LLE Proprioception Grossly Intact   Bed Mobility   Rolling R 7  Independent   Rolling L 7  Independent   Supine to Sit 7  Independent   Sit to Supine 7  Independent   Transfers   Sit to Stand 7  Independent   Stand to Sit 7  Independent   Stand pivot 7  Independent   Toilet transfer 7  Independent   Ambulation/Elevation   Gait pattern WNL   Assistive Device None   Stair Management Technique One rail L;Reciprocal   Number of Stairs 12   Balance   Static Sitting Normal   Dynamic Sitting Normal   Static Standing Normal   Dynamic Standing Normal   Ambulatory Normal   Endurance Deficit   Endurance Deficit No   Activity Tolerance   Activity Tolerance Patient tolerated treatment well   Nurse Made Aware spoke with RN, Rosalba Reza   Assessment   Prognosis Excellent   Assessment Pt is a 60 y/o male s/p L3-5 laminectomy with Decompression to address Lumbar Disc Disease with radiculopathy  pt presents with minimal pain, controlled well at this time with pain meds, allowing pt to function and tolerate all activity  Pt currently perfroming all fnctional transfers Ind, ambulating w/o  ft and negoatiates FF of stairs safely with single HR  Pt declines continued outpt therapy at this time  Pt safe for d/c home  Skilled inpt PT services no longer warranted     Barriers to Discharge None   Plan   Treatment/Interventions Spoke to nursing;Spoke to case management   Recommendation   Recommendation Home with family support   PT - OK to Discharge Yes   Barthel Index   Feeding 10   Bathing 5   Grooming Score 5   Dressing Score 10   Bladder Score 10   Bowels Score 10   Toilet Use Score 10   Transfers (Bed/Chair) Score 15   Mobility (Level Surface) Score 15   Stairs Score 10   Barthel Index Score 100

## 2017-10-23 ENCOUNTER — GENERIC CONVERSION - ENCOUNTER (OUTPATIENT)
Dept: OTHER | Facility: OTHER | Age: 57
End: 2017-10-23

## 2017-11-06 ENCOUNTER — ALLSCRIPTS OFFICE VISIT (OUTPATIENT)
Dept: OTHER | Facility: OTHER | Age: 57
End: 2017-11-06

## 2017-12-27 ENCOUNTER — ALLSCRIPTS OFFICE VISIT (OUTPATIENT)
Dept: OTHER | Facility: OTHER | Age: 57
End: 2017-12-27

## 2017-12-27 DIAGNOSIS — M54.50 LOW BACK PAIN: ICD-10-CM

## 2017-12-28 NOTE — PROGRESS NOTES
Assessment   1  Lumbar spinal stenosis (724 02) (M48 061)   2  Lumbar degenerative disc disease (722 52) (M51 36)   3  Lumbar radiculopathy (724 4) (M54 16)      Multilevel lumbar spinal stenosis and radiculopathy  Radicular symptoms improved following lumbar laminectomy decompression L3-L5     Neurologically stable       Plan   Low back pain    · * XR SPINE LUMBAR 2 OR 3 VIEWS INJURY; Status:Active - Retrospective By Protocol    Authorization; Requested for:[id number]; Complete resolution of radicular symptoms following decompression  Follow-up p r n  Discussion/Summary   Complete resolution of lumbar radicular symptoms following decompression         Chief Complaint   1  Back Pain  Postop visit      Post-Op   HPI: Patient is over 2 months status post lumbar laminectomy decompression L3-5 for radicular symptoms  He reports complete resolution of lower back and leg pain  He is extremely happy with his progress thus far  Denies any weakness  Denies any fevers chills or constitutional symptoms  Offers no new complaints today  Review of Systems        Constitutional: No fever or chills, feels well, no tiredness, no recent weight loss or weight gain  Eyes: No complaints of red eyes, no eyesight problems  ENT: no complaints of loss of hearing, no nosebleeds, no sore throat  Cardiovascular: No complaints of chest pain, no palpitations, no leg claudication or lower extremity edema  Respiratory: No complaints of shortness of breath, no wheezing, no cough  Gastrointestinal: No complaints of abdominal pain, no constipation, no nausea or vomiting, no diarrhea or bloody stools  Genitourinary: No complaints of dysuria or incontinence, no hesitancy, no nocturia  Musculoskeletal: arthralgias-- and-- myalgias, but-- as noted in HPI  Integumentary: No complaints of skin rash or lesion, no itching or dry skin, no skin wounds        Neurological: No complaints of headache, no confusion, no numbness or tingling, no dizziness  Psychiatric: No suicidal thoughts, no anxiety, no depression  Active Problems   1  Aftercare following surgery (V58 89) (Z48 89)   2  Elevated PSA (790 93) (R97 20)   3  Low back pain (724 2) (M54 5)   4  Lumbar degenerative disc disease (722 52) (M51 36)   5  Lumbar radiculopathy (724 4) (M54 16)   6  Lumbar spinal stenosis (724 02) (M48 061)   7  Pain syndrome, chronic (338 4) (G89 4)    Social History    · Former smoker (A42 34) (R47 203)   ·    · Social alcohol use (Z78 9)    Current Meds    1  Advil 200 MG Oral Capsule Recorded   2  Diclofenac Sodium 25 MG Oral Tablet Delayed Release; 1 po BID PRN no other     NSAIDS, take with food; Therapy: 27KRN2851 to (Evaluate:18Uyz1237)  Requested for: 23Cgi0221; Last     Rx:00Mve3716; Status: ACTIVE - Transmit to Pharmacy - Awaiting Verification Ordered   3  Gabapentin 300 MG Oral Capsule; 1 capsule po HS for 2 weeks then 2 capsules po HS; Therapy: 33VBP8369 to (Evaluate:86Sid5759)  Requested for: 87Ype0838; Last     Rx:72Tbf0956 Ordered   4  Senna-S 8 6-50 MG Oral Tablet; TAKE 2 TABLETS DAILY AS NEEDED; Therapy: 06DIZ0053 to [id number])  Requested for: 07AQS8057; Last     Rx:13Oct2017; Status: ACTIVE - Transmit to Pharmacy - Awaiting Verification Ordered   5  Sulfamethoxazole-Trimethoprim 800-160 MG Oral Tablet (Bactrim DS); TAKE 1 TABLET     TWICE DAILY UNTIL FINISHED; Therapy: 61SVL6703 to (96 800067)  Requested for: 052 576 88 48; Last     Rx:23Oct2017; Status: ACTIVE - Transmit to Pharmacy - Awaiting Verification Ordered    Allergies   1  No Known Drug Allergies    Vitals    Recorded: 73BBF9126 09:02AM   Heart Rate 80   Systolic 475   Diastolic 91   Weight 703 lb    BMI Calculated 24 69   BSA Calculated 2     Physical Exam   Lumbar incision is well healed, clean dry and intact  Nontender  Gait is normal  5/5 strength L2-S1 bilaterally   Negative straight leg raise bilaterally  Able to toe-walk and heel-walk without difficulty  Awake alert and oriented x3  Affect is appropriate  Constitutional - General appearance: Normal       Musculoskeletal - Gait and station: Normal -- Muscle strength/tone: Normal -- Upper extremity compartments: Normal -- Lower extremity compartments: Normal       Cardiovascular - Pulses: Normal -- Examination of extremities for edema and/or varicosities: Normal -- Heart - RRR, no murmurs, no rubs, no gallops  Respiratory - Lungs - Clear to auscultation bilaterally, no rales, no rhonci, no wheezes  Abdomen - Abdomen - Soft, nontender, nondistended        Lymphatic - Palpation of lymph nodes in other areas: Normal       Skin - Skin and subcutaneous tissue: Normal       Neurologic - Sensation: Normal -- Upper extremity peripheral neuro exam: Normal -- Lower extremity peripheral neuro exam: Normal       Psychiatric - Orientation to person, place, and time: Normal -- Mood and affect: Normal       Eyes      Conjunctiva and lids: Normal        Pupils and irises: Normal        Signatures    Electronically signed by : VALENTINE Vera ; Dec 27 2017  9:16AM EST                       (Author)

## 2018-01-14 VITALS — TEMPERATURE: 98.9 F | HEIGHT: 71 IN | HEART RATE: 86 BPM | WEIGHT: 169 LBS | BODY MASS INDEX: 23.66 KG/M2

## 2018-01-14 VITALS
HEART RATE: 80 BPM | WEIGHT: 167.38 LBS | DIASTOLIC BLOOD PRESSURE: 80 MMHG | TEMPERATURE: 98.9 F | SYSTOLIC BLOOD PRESSURE: 143 MMHG | HEIGHT: 71 IN | BODY MASS INDEX: 23.43 KG/M2

## 2018-01-14 VITALS
DIASTOLIC BLOOD PRESSURE: 80 MMHG | HEART RATE: 80 BPM | WEIGHT: 171 LBS | BODY MASS INDEX: 23.94 KG/M2 | SYSTOLIC BLOOD PRESSURE: 140 MMHG | HEIGHT: 71 IN

## 2018-01-14 NOTE — RESULT NOTES
Message   Recorded as Task   Date: 06/23/2017 08:23 AM, Created By: Fito Calix   Task Name: Follow Up   Assigned To: [facility],Team   Regarding Patient: Oksana Roque, Status: Active   CommentEvelene Pipes - 23 Jun 5314 8:77 AM     TASK CREATED  S/P L L4 & L5 TFESI ON 6/16/2017 W/ DR MAO - F/U SCHEDULED ON 7/12 W/ Sendy Burris - 23 Jun 2317 08:99 AM     TASK EDITED  Patient reports 40% relief so far  He understand it may take more time    He still does have leg numbbness  Confirmed f/u 7/12   Moshe Mao - 23 Jun 2017 10:13 AM     TASK REPLIED TO: Previously Assigned To [place]  Thanks          Signatures   Electronically signed by : Avni Ballesteros, ; Jun 23 2017 11:56AM EST                       (Author)

## 2018-01-15 NOTE — MISCELLANEOUS
Message  Return to work or school:   Yan Lacey is under my professional care  He was seen in my office on 11/6/2017   He is able to return to work on  11/20/2017      Weight Bearing Status: Weight-Bearing As Tolerated  Domitila Bacon PA-C        Signatures   Electronically signed by : Domitila Bacon, [facility]; Nov 6 2017  9:18AM EST                       (Author)

## 2018-01-17 NOTE — MISCELLANEOUS
Message   Recorded as Task   Date: 07/26/2017 11:58 AM, Created By: Bhavesh Soto   Task Name: Miscellaneous   Assigned To: [facility],Team   Regarding Patient: Rosa Zamudio, Status: Active   Comment:    Lisa Rivas - 26 Jul 2017 11:58 AM     TASK CREATED  Pt called stating he is on Gabapentin 600 mg and it is not helping  He said he has been on Gabapentin for 2 weeks and just started 600 mg  He said he is limping around and the pain is unbearable  Pls call pt at [phone]  Sangita Santa - 26 Jul 2017 12:48 PM     TASK EDITED  lm for pt to [facility] - 26 Jul 2017 12:50 PM     TASK IN PROGRESS   Daria Moser - 26 Jul 2017 1:38 PM     TASK EDITED  phone call from patient returning your call, please patient at [phone], patient will be available  Sangita Santa - 26 Jul 2017 1:47 PM     TASK EDITED  lm for pt to cb   Raphael Guillen - 26 Jul 2017 1:56 PM     TASK EDITED  Pt called back  Please call [phone]  Sangita Santa - 26 Jul 2017 2:31 PM     TASK EDITED  spoke with pt, states that the he has been on the Gabapentin 600mg for 2weekd know , and the pain in his left thigh and leg has come back, and he is having a dificult time walking and has been limping around and the pain is unbearable  pt alos c/o of tingling feeling go down his leg     ******please advise******   Moshe Mao - 26 Jul 2017 2:50 PM     TASK REPLIED TO: Previously Assigned To [address]  If his pain is the same as before the TFESI, I would repeat the procedure  Please schedule if interested  Sangita Santa - 27 Jul 2017 9:59 AM     TASK EDITED  spoke with this pt and darvin him aware of you reccomendations , pt does not want a repeat injection   Moshe Mao - 27 Jul 2017 1:13 PM     TASK REPLIED TO: Previously Assigned To [address]  Have him return for next available appointment with either myself, Sulaiman, or Sangita Bradley - 27 Jul 2017 3:25 PM     TASK EDITED  Made appt with DR Anaya on 08/22/17        Active Problems    1  Elevated PSA (790 93) (R97 20)   2  Low back pain (724 2) (M54 5)   3  Lumbar degenerative disc disease (722 52) (M51 36)   4  Lumbar radiculopathy (724 4) (M54 16)   5  Lumbar spinal stenosis (724 02) (M48 06)   6  Pain syndrome, chronic (338 4) (G89 4)    Current Meds   1  Advil 200 MG Oral Capsule Recorded   2  Diclofenac Sodium 25 MG Oral Tablet Delayed Release; 1 po BID PRN no other   NSAIDS, take with food; Therapy: 86QTS4478 to (Evaluate:64Juh1778)  Requested for: 56Tde8384; Last   Rx:12Gxr8925; Status: ACTIVE - Transmit to Pharmacy - Awaiting Verification Ordered   3  Diovan 320 MG Oral Tablet (Valsartan); Therapy: (Recorded:44Qmw3263) to Recorded   4  Gabapentin 300 MG Oral Capsule; 1 capsule po HS for 2 weeks then 2 capsules po HS; Therapy: 63QSI9762 to (Evaluate:79Ccl8651)  Requested for: 65Wry7355; Last   Rx:74Bkm6914 Ordered    Allergies    1   No Known Drug Allergies    Signatures   Electronically signed by : Ariel Martel, ; Jul 27 2017  3:25PM EST                       (Author)

## 2018-01-22 VITALS
SYSTOLIC BLOOD PRESSURE: 146 MMHG | HEART RATE: 73 BPM | WEIGHT: 177 LBS | BODY MASS INDEX: 25.4 KG/M2 | DIASTOLIC BLOOD PRESSURE: 83 MMHG

## 2018-01-22 VITALS
HEART RATE: 68 BPM | SYSTOLIC BLOOD PRESSURE: 170 MMHG | BODY MASS INDEX: 24.78 KG/M2 | HEIGHT: 71 IN | WEIGHT: 177 LBS | DIASTOLIC BLOOD PRESSURE: 89 MMHG

## 2018-01-22 VITALS
WEIGHT: 177 LBS | BODY MASS INDEX: 25.4 KG/M2 | HEART RATE: 80 BPM | DIASTOLIC BLOOD PRESSURE: 94 MMHG | SYSTOLIC BLOOD PRESSURE: 156 MMHG

## 2018-01-22 VITALS
HEART RATE: 80 BPM | BODY MASS INDEX: 25.4 KG/M2 | SYSTOLIC BLOOD PRESSURE: 167 MMHG | DIASTOLIC BLOOD PRESSURE: 91 MMHG | WEIGHT: 177 LBS

## 2019-09-11 ENCOUNTER — OFFICE VISIT (OUTPATIENT)
Dept: OBGYN CLINIC | Facility: CLINIC | Age: 59
End: 2019-09-11
Payer: COMMERCIAL

## 2019-09-11 ENCOUNTER — APPOINTMENT (OUTPATIENT)
Dept: RADIOLOGY | Facility: AMBULARY SURGERY CENTER | Age: 59
End: 2019-09-11
Payer: COMMERCIAL

## 2019-09-11 VITALS
BODY MASS INDEX: 24.2 KG/M2 | SYSTOLIC BLOOD PRESSURE: 126 MMHG | DIASTOLIC BLOOD PRESSURE: 77 MMHG | HEART RATE: 77 BPM | HEIGHT: 70 IN | WEIGHT: 169 LBS

## 2019-09-11 DIAGNOSIS — M43.16 SPONDYLOLISTHESIS, LUMBAR REGION: ICD-10-CM

## 2019-09-11 DIAGNOSIS — M51.9 LUMBAR DISC DISEASE: Primary | ICD-10-CM

## 2019-09-11 DIAGNOSIS — M51.9 LUMBAR DISC DISEASE: ICD-10-CM

## 2019-09-11 PROCEDURE — 99214 OFFICE O/P EST MOD 30 MIN: CPT | Performed by: ORTHOPAEDIC SURGERY

## 2019-09-11 PROCEDURE — 72110 X-RAY EXAM L-2 SPINE 4/>VWS: CPT

## 2019-09-11 RX ORDER — GABAPENTIN 300 MG/1
300 CAPSULE ORAL
Qty: 60 CAPSULE | Refills: 0 | Status: SHIPPED | OUTPATIENT
Start: 2019-09-11

## 2019-09-11 NOTE — PROGRESS NOTES
Assessment/Plan:    Patient seen and examined with Dr You Shen  He presents with a four month history of worsening right lower back pain and sensory changes he L4 nerve root distribution  He does have a previous history of L3-L5 lumbar laminectomy decompression for which he states his previous left lower extremity symptoms are completely resolved  X-rays in the office today demonstrate progression of multilevel lumbar degenerative disc disease with grade 1-2 spondylolisthesis L4-5  Our recommendation is to obtain an MRI of the lumbar spine with contrast for further evaluation given his symptoms and progression of pathology on x-ray  He will need BUN and creatinine prior to the MRI  Start gabapentin  Follow-up 1-2 weeks for imaging review  Problem List Items Addressed This Visit        Musculoskeletal and Integument    Lumbar disc disease - Primary            Subjective:      Patient ID: Caroline Diego is a 62 y o  male  HPI     The patient is a 64-year-old male presents for follow-up appointment  He is known to our office is he is two years status post lumbar laminectomy decompression L3-5  According to the patient, over the last four months he has been experiencing increased right-sided lower back pain with radiation to the buttock region  He has also noticed associated intermittent numbness right shin and lateral calf region  He denies any associated bowel or bladder incontinence, no saddle anesthesia  No significant weakness of the lower extremities  Symptoms are made worse with standing and walking long distances, and are somewhat improved in the sitting position  He states his previous left lower extremity symptoms have completely resolved following his previous surgery  He denies any injury  No other complaints at this time  He has not done formal physical therapy or had injections over the last four months        The following portions of the patient's history were reviewed and updated as appropriate: allergies, current medications, past family history, past medical history, past social history, past surgical history and problem list     Review of Systems   Constitutional: Negative for activity change, chills, diaphoresis, fatigue and fever  Respiratory: Negative  Cardiovascular: Negative  Gastrointestinal: Negative  Genitourinary: Negative for decreased urine volume and difficulty urinating  Musculoskeletal: Positive for arthralgias and back pain  Negative for gait problem  Skin: Negative  Neurological: Positive for numbness  Negative for weakness  All other systems reviewed and are negative  Objective:      Ht 5' 10" (1 778 m)   Wt 76 7 kg (169 lb)   BMI 24 25 kg/m²          Physical Exam   Constitutional: He is oriented to person, place, and time  He appears well-developed and well-nourished  No distress  HENT:   Head: Normocephalic and atraumatic  Eyes: Right eye exhibits no discharge  Pulmonary/Chest: Effort normal  No respiratory distress  Abdominal: Soft  Musculoskeletal: He exhibits tenderness  He exhibits no edema  Neurological: He is alert and oriented to person, place, and time  Skin: Skin is warm and dry  He is not diaphoretic  Psychiatric: He has a normal mood and affect  Nursing note and vitals reviewed  No acute distress  Gait is normal   Inspection reveals well-healed lumbar incision  Mild tenderness to palpation right lumbosacral region/right SI joint  Positive modified straight leg raise on the right  Strength is 5/5 L2-S1 bilaterally  Sensation is diminished to light touch right L4 and L5 nerve root distribution compared to the left  Absent reflexes noted at L4 and S1 symmetrically  There is no calf tenderness or pitting edema

## 2019-09-25 ENCOUNTER — HOSPITAL ENCOUNTER (OUTPATIENT)
Dept: MRI IMAGING | Facility: HOSPITAL | Age: 59
Discharge: HOME | End: 2019-09-25
Payer: COMMERCIAL

## 2019-09-25 DIAGNOSIS — M51.9 LUMBAR DISC DISEASE: ICD-10-CM

## 2019-09-25 DIAGNOSIS — M43.16 SPONDYLOLISTHESIS, LUMBAR REGION: ICD-10-CM

## 2019-09-25 PROCEDURE — A9585 GADOBUTROL INJECTION: HCPCS | Performed by: PHYSICIAN ASSISTANT

## 2019-09-25 PROCEDURE — 72158 MRI LUMBAR SPINE W/O & W/DYE: CPT

## 2019-09-25 RX ADMIN — GADOBUTROL 7 ML: 604.72 INJECTION INTRAVENOUS at 07:17

## 2019-09-26 ENCOUNTER — TELEPHONE (OUTPATIENT)
Dept: OBGYN CLINIC | Facility: HOSPITAL | Age: 59
End: 2019-09-26

## 2019-09-26 DIAGNOSIS — M43.16 SPONDYLOLISTHESIS, LUMBAR REGION: Primary | ICD-10-CM

## 2019-09-26 RX ORDER — METHYLPREDNISOLONE 4 MG/1
TABLET ORAL
Qty: 21 TABLET | Refills: 0 | Status: SHIPPED | OUTPATIENT
Start: 2019-09-26 | End: 2019-10-31

## 2019-09-26 RX ORDER — METHOCARBAMOL 500 MG/1
500 TABLET, FILM COATED ORAL 4 TIMES DAILY
Qty: 30 TABLET | Refills: 0 | Status: SHIPPED | OUTPATIENT
Start: 2019-09-26 | End: 2019-10-31

## 2019-09-26 NOTE — TELEPHONE ENCOUNTER
Dr Honeycutt  #: [phone]           Patient is calling in requesting a call back  Patient is currently taking gabapentin 2x a day and he states he still in a lot of pain  He advised " it's not doing anything" and would like to know if he can get something else for the pain   Please advise, thank you

## 2019-09-26 NOTE — TELEPHONE ENCOUNTER
Please be advise Jaylene Gay from Radiology called with Significant findings on MRI of Lumbar spine  Please see results in Epic

## 2019-09-27 NOTE — TELEPHONE ENCOUNTER
Rx cancelled at [facility] and called to [facility]

## 2019-09-27 NOTE — TELEPHONE ENCOUNTER
Patient is calling because his scripts for Prednisone and Robaxin were sent to the incorrect pharmacy  They were supposed to go to the [address] [place] [address] on file and not [facility]

## 2019-10-09 ENCOUNTER — OFFICE VISIT (OUTPATIENT)
Dept: OBGYN CLINIC | Facility: CLINIC | Age: 59
End: 2019-10-09
Payer: COMMERCIAL

## 2019-10-09 VITALS
DIASTOLIC BLOOD PRESSURE: 81 MMHG | HEIGHT: 70 IN | BODY MASS INDEX: 24.2 KG/M2 | WEIGHT: 169 LBS | HEART RATE: 85 BPM | SYSTOLIC BLOOD PRESSURE: 157 MMHG

## 2019-10-09 DIAGNOSIS — M43.16 SPONDYLOLISTHESIS, LUMBAR REGION: ICD-10-CM

## 2019-10-09 DIAGNOSIS — M51.9 LUMBAR DISC DISEASE: Primary | ICD-10-CM

## 2019-10-09 PROCEDURE — 99213 OFFICE O/P EST LOW 20 MIN: CPT | Performed by: ORTHOPAEDIC SURGERY

## 2019-10-09 NOTE — PROGRESS NOTES
Assessment/Plan:    Patient seen and examined with Dr Giselle Rios  MRI of the lumbar spine reviewed and reveals multilevel lumbar DDD with areas of severe bilateral foraminal stenosis at L4-5  There is a grade 1 spondylolisthesis at this level  Our recommendation would be referral to pain management for lumbar transforaminal DARLINE L4-5  Follow-up in one month for re-evaluation  If no improvement with the injections, he is a candidate for lumbar fusion procedure  Problem List Items Addressed This Visit        Musculoskeletal and Integument    Lumbar disc disease - Primary    Spondylolisthesis, lumbar region            Subjective:      Patient ID: Tom Walls is a 62 y o  male  HPI     Patient is a 17-year-old male presents for follow-up appointment  He was last seen several weeks ago for a four month history of increasing right-sided lower back pain with radiation to the buttock region  This is associated with intermittent numbness of the right shin and lateral calf  No associated bowel or bladder incontinence, no saddle anesthesia  No significant weakness to lower extremities  He was sent for an MRI of the lumbar spine is here to review the study  He does have a history of lumbar laminectomy decompression L3-5 done about two years ago  He notes his left lower extremity symptoms from this have completely resolved  He has not done formal physical therapy or had injections over the last four months  No new complaints today  The following portions of the patient's history were reviewed and updated as appropriate: allergies, current medications, past family history, past medical history, past social history, past surgical history and problem list     Review of Systems      Objective:      /81   Pulse 85   Ht 5' 10" (1 778 m)   Wt 76 7 kg (169 lb)   BMI 24 25 kg/m²          Physical Exam   Constitutional: He is oriented to person, place, and time   He appears well-developed and well-nourished  No distress  HENT:   Head: Normocephalic and atraumatic  Eyes: Right eye exhibits no discharge  Cardiovascular: Intact distal pulses  Pulmonary/Chest: Effort normal    Abdominal: Soft  Musculoskeletal: He exhibits no tenderness  Neurological: He is alert and oriented to person, place, and time  Skin: Skin is warm and dry  He is not diaphoretic  Psychiatric: He has a normal mood and affect  Nursing note and vitals reviewed  No acute distress  Gait is normal without assistance  Old well-healed lumbar incision  Mild tenderness to palpation right lumbosacral region  Positive modified straight leg raise on the right  Strength is 5/5 L2-S1 bilaterally  Sensation is diminished to light touch right L4 and L5 nerve root distribution compared to the left  Absent reflexes at L4 and S1 symmetrically  No calf tenderness or pitting edema

## 2019-10-16 ENCOUNTER — OFFICE VISIT (OUTPATIENT)
Dept: OBGYN CLINIC | Facility: CLINIC | Age: 59
End: 2019-10-16
Payer: COMMERCIAL

## 2019-10-16 VITALS
HEART RATE: 82 BPM | BODY MASS INDEX: 24.62 KG/M2 | HEIGHT: 70 IN | SYSTOLIC BLOOD PRESSURE: 120 MMHG | DIASTOLIC BLOOD PRESSURE: 75 MMHG | WEIGHT: 172 LBS

## 2019-10-16 DIAGNOSIS — M43.16 SPONDYLOLISTHESIS, LUMBAR REGION: Primary | ICD-10-CM

## 2019-10-16 DIAGNOSIS — M54.16 RADICULOPATHY, LUMBAR REGION: ICD-10-CM

## 2019-10-16 DIAGNOSIS — M51.9 LUMBAR DISC DISEASE: ICD-10-CM

## 2019-10-16 PROCEDURE — 99214 OFFICE O/P EST MOD 30 MIN: CPT | Performed by: ORTHOPAEDIC SURGERY

## 2019-10-16 RX ORDER — GABAPENTIN 100 MG/1
300 CAPSULE ORAL ONCE
Status: CANCELLED | OUTPATIENT
Start: 2019-10-16 | End: 2019-10-16

## 2019-10-16 RX ORDER — ACETAMINOPHEN 325 MG/1
975 TABLET ORAL ONCE
Status: CANCELLED | OUTPATIENT
Start: 2019-10-16 | End: 2019-10-16

## 2019-10-16 RX ORDER — TRAMADOL HYDROCHLORIDE 50 MG/1
50 TABLET ORAL EVERY 6 HOURS PRN
Qty: 30 TABLET | Refills: 0 | Status: SHIPPED | OUTPATIENT
Start: 2019-10-16 | End: 2019-10-16 | Stop reason: ALTCHOICE

## 2019-10-16 RX ORDER — CHLORHEXIDINE GLUCONATE 0.12 MG/ML
15 RINSE ORAL ONCE
Status: CANCELLED | OUTPATIENT
Start: 2019-10-16 | End: 2019-10-16

## 2019-10-16 RX ORDER — TRAMADOL HYDROCHLORIDE 50 MG/1
50 TABLET ORAL EVERY 6 HOURS PRN
Qty: 30 TABLET | Refills: 0 | Status: SHIPPED | OUTPATIENT
Start: 2019-10-16

## 2019-10-16 NOTE — PROGRESS NOTES
Assessment/Plan:    Patient seen and examined with Dr Moe Fuller  He presents for follow-up right lumbar radiculopathy  He is not interested in obtaining injections due to worsening symptoms  He wishes to move forward with surgical intervention  Patient is a candidate for revision lumbar laminectomy decompression L3-5, with instrumented posterior lateral fusion L3-5, and interbody fusion L4-5  Risks and benefits of the procedure were discussed with the patient and he wishes to move forward  He will need preoperative lab work, EKG, chest x-ray, and medical clearance  Follow-up in the post-operative period  Problem List Items Addressed This Visit        Musculoskeletal and Integument    Lumbar disc disease    Spondylolisthesis, lumbar region - Primary            Subjective:      Patient ID: Shad Drew is a 62 y o  male  HPI     The patient is a 77-year-old male who presents for a follow-up visit  He was last seen about a week ago for a five month history of increasing right-sided lower back pain with radiation to the buttock region  This is associated with intermittent numbness of the right shin and lateral calf  Since then, he notes that his right anterior thigh pain is throbbing and worsening  No associated bowel or bladder incontinence, no saddle anesthesia  No significant weakness  He was sent for injections however he declined this and would like to discuss surgical options  He does have a history of lumbar laminectomy decompression L3-5 done about two years ago, and notes his left lower extremity symptoms from this have completely resolved  The following portions of the patient's history were reviewed and updated as appropriate: allergies, current medications, past family history, past medical history, past social history, past surgical history and problem list     Review of Systems   Constitutional: Negative for activity change, chills, diaphoresis, fatigue and fever  Respiratory: Negative  Cardiovascular: Negative  Gastrointestinal: Negative  Genitourinary: Negative for decreased urine volume and difficulty urinating  Musculoskeletal: Positive for arthralgias and back pain  Negative for gait problem  Skin: Negative  Neurological: Positive for numbness  Negative for weakness  Objective: Wt 78 kg (172 lb)   BMI 24 68 kg/m²          Physical Exam   Constitutional: He is oriented to person, place, and time  He appears well-developed and well-nourished  No distress  HENT:   Head: Normocephalic and atraumatic  Eyes: Right eye exhibits no discharge  Cardiovascular: Intact distal pulses  Pulmonary/Chest: Effort normal  No respiratory distress  Abdominal: Soft  Musculoskeletal: He exhibits tenderness  Neurological: He is alert and oriented to person, place, and time  Skin: Skin is warm and dry  He is not diaphoretic  Psychiatric: He has a normal mood and affect  Nursing note and vitals reviewed  No acute distress  Gait is normal without assistance  Hold lumbar incision is well healed  Mild tenderness palpation right lumbosacral region  Positive modified straight leg raise on the right  Strength is 5/5 L2-S1 bilaterally  Sensation is diminished to light touch right L4 and L5 nerve root distribution compared to the left  Absent reflexes at L4 and S1 symmetrically    No calf tenderness, no pitting edema

## 2019-10-17 ENCOUNTER — APPOINTMENT (OUTPATIENT)
Dept: LAB | Facility: CLINIC | Age: 59
End: 2019-10-17
Payer: COMMERCIAL

## 2019-10-17 ENCOUNTER — HOSPITAL ENCOUNTER (OUTPATIENT)
Dept: RADIOLOGY | Facility: HOSPITAL | Age: 59
Discharge: HOME | End: 2019-10-17
Attending: ORTHOPAEDIC SURGERY
Payer: COMMERCIAL

## 2019-10-17 ENCOUNTER — TRANSCRIBE ORDERS (OUTPATIENT)
Dept: LAB | Facility: CLINIC | Age: 59
End: 2019-10-17

## 2019-10-17 DIAGNOSIS — M54.16 RADICULOPATHY, LUMBAR REGION: ICD-10-CM

## 2019-10-17 DIAGNOSIS — M51.9 LUMBAR DISC DISEASE: ICD-10-CM

## 2019-10-17 DIAGNOSIS — M43.16 SPONDYLOLISTHESIS, LUMBAR REGION: ICD-10-CM

## 2019-10-17 DIAGNOSIS — Z01.818 OTHER SPECIFIED PRE-OPERATIVE EXAMINATION: Primary | ICD-10-CM

## 2019-10-17 LAB
ABO GROUP BLD: NORMAL
ALBUMIN SERPL BCP-MCNC: 3.9 G/DL (ref 3.5–5)
ALP SERPL-CCNC: 86 U/L (ref 46–116)
ALT SERPL W P-5'-P-CCNC: 39 U/L (ref 12–78)
ANION GAP SERPL CALCULATED.3IONS-SCNC: 10 MMOL/L (ref 4–13)
APTT PPP: 28 SECONDS (ref 23–37)
AST SERPL W P-5'-P-CCNC: 22 U/L (ref 5–45)
ATRIAL RATE: 74 BPM
BASOPHILS # BLD AUTO: 0.02 THOUSANDS/ΜL (ref 0–0.1)
BASOPHILS NFR BLD AUTO: 0 % (ref 0–1)
BILIRUB SERPL-MCNC: 0.5 MG/DL (ref 0.2–1)
BILIRUB UR QL STRIP: NEGATIVE
BLD GP AB SCN SERPL QL: NEGATIVE
BUN SERPL-MCNC: 14 MG/DL (ref 5–25)
CALCIUM SERPL-MCNC: 9 MG/DL (ref 8.3–10.1)
CHLORIDE SERPL-SCNC: 105 MMOL/L (ref 100–108)
CLARITY UR: CLEAR
CO2 SERPL-SCNC: 26 MMOL/L (ref 21–32)
COLOR UR: YELLOW
CREAT SERPL-MCNC: 0.89 MG/DL (ref 0.6–1.3)
EOSINOPHIL # BLD AUTO: 0.16 THOUSAND/ΜL (ref 0–0.61)
EOSINOPHIL NFR BLD AUTO: 2 % (ref 0–6)
ERYTHROCYTE [DISTWIDTH] IN BLOOD BY AUTOMATED COUNT: 12.1 % (ref 11.6–15.1)
EST. AVERAGE GLUCOSE BLD GHB EST-MCNC: 97 MG/DL
GFR SERPL CREATININE-BSD FRML MDRD: 92 ML/MIN/1.73SQ M
GLUCOSE SERPL-MCNC: 80 MG/DL (ref 65–140)
GLUCOSE UR STRIP-MCNC: NEGATIVE MG/DL
HBA1C MFR BLD: 5 % (ref 4.2–6.3)
HCT VFR BLD AUTO: 47.3 % (ref 36.5–49.3)
HGB BLD-MCNC: 16.2 G/DL (ref 12–17)
HGB UR QL STRIP.AUTO: NEGATIVE
IMM GRANULOCYTES # BLD AUTO: 0.02 THOUSAND/UL (ref 0–0.2)
IMM GRANULOCYTES NFR BLD AUTO: 0 % (ref 0–2)
INR PPP: 1.01 (ref 0.84–1.19)
KETONES UR STRIP-MCNC: NEGATIVE MG/DL
LEUKOCYTE ESTERASE UR QL STRIP: NEGATIVE
LYMPHOCYTES # BLD AUTO: 1.39 THOUSANDS/ΜL (ref 0.6–4.47)
LYMPHOCYTES NFR BLD AUTO: 19 % (ref 14–44)
MCH RBC QN AUTO: 32.1 PG (ref 26.8–34.3)
MCHC RBC AUTO-ENTMCNC: 34.2 G/DL (ref 31.4–37.4)
MCV RBC AUTO: 94 FL (ref 82–98)
MONOCYTES # BLD AUTO: 0.83 THOUSAND/ΜL (ref 0.17–1.22)
MONOCYTES NFR BLD AUTO: 11 % (ref 4–12)
NEUTROPHILS # BLD AUTO: 4.99 THOUSANDS/ΜL (ref 1.85–7.62)
NEUTS SEG NFR BLD AUTO: 68 % (ref 43–75)
NITRITE UR QL STRIP: NEGATIVE
NRBC BLD AUTO-RTO: 0 /100 WBCS
P AXIS: 42 DEGREES
PH UR STRIP.AUTO: 6.5 [PH]
PLATELET # BLD AUTO: 218 THOUSANDS/UL (ref 149–390)
PMV BLD AUTO: 10 FL (ref 8.9–12.7)
POTASSIUM SERPL-SCNC: 4.3 MMOL/L (ref 3.5–5.3)
PR INTERVAL: 144 MS
PROT SERPL-MCNC: 7.2 G/DL (ref 6.4–8.2)
PROT UR STRIP-MCNC: NEGATIVE MG/DL
PROTHROMBIN TIME: 12.7 SECONDS (ref 11.6–14.5)
QRS AXIS: 59 DEGREES
QRSD INTERVAL: 86 MS
QT INTERVAL: 378 MS
QTC INTERVAL: 419 MS
RBC # BLD AUTO: 5.04 MILLION/UL (ref 3.88–5.62)
RH BLD: POSITIVE
SODIUM SERPL-SCNC: 141 MMOL/L (ref 136–145)
SP GR UR STRIP.AUTO: 1.01 (ref 1–1.03)
SPECIMEN EXPIRATION DATE: NORMAL
T WAVE AXIS: 42 DEGREES
UROBILINOGEN UR QL STRIP.AUTO: 0.2 E.U./DL
VENTRICULAR RATE: 74 BPM
WBC # BLD AUTO: 7.41 THOUSAND/UL (ref 4.31–10.16)

## 2019-10-17 PROCEDURE — 71046 X-RAY EXAM CHEST 2 VIEWS: CPT

## 2019-10-17 PROCEDURE — 83036 HEMOGLOBIN GLYCOSYLATED A1C: CPT

## 2019-10-17 PROCEDURE — 86900 BLOOD TYPING SEROLOGIC ABO: CPT

## 2019-10-17 PROCEDURE — 93010 ELECTROCARDIOGRAM REPORT: CPT | Performed by: INTERNAL MEDICINE

## 2019-10-17 PROCEDURE — 86850 RBC ANTIBODY SCREEN: CPT

## 2019-10-17 PROCEDURE — 80053 COMPREHEN METABOLIC PANEL: CPT

## 2019-10-17 PROCEDURE — 81003 URINALYSIS AUTO W/O SCOPE: CPT | Performed by: ORTHOPAEDIC SURGERY

## 2019-10-17 PROCEDURE — 85730 THROMBOPLASTIN TIME PARTIAL: CPT

## 2019-10-17 PROCEDURE — 85610 PROTHROMBIN TIME: CPT

## 2019-10-17 PROCEDURE — 93005 ELECTROCARDIOGRAM TRACING: CPT

## 2019-10-17 PROCEDURE — 85025 COMPLETE CBC W/AUTO DIFF WBC: CPT

## 2019-10-17 PROCEDURE — 86901 BLOOD TYPING SEROLOGIC RH(D): CPT

## 2019-10-17 PROCEDURE — 36415 COLL VENOUS BLD VENIPUNCTURE: CPT | Performed by: ORTHOPAEDIC SURGERY

## 2019-10-22 ENCOUNTER — ANESTHESIA EVENT (OUTPATIENT)
Dept: PERIOP | Facility: HOSPITAL | Age: 59
DRG: 460 | End: 2019-10-22
Payer: COMMERCIAL

## 2019-10-29 ENCOUNTER — TELEPHONE (OUTPATIENT)
Dept: OBGYN CLINIC | Facility: HOSPITAL | Age: 59
End: 2019-10-29

## 2019-10-31 DIAGNOSIS — Z91.89 RISK FACTORS FOR OBSTRUCTIVE SLEEP APNEA: Primary | ICD-10-CM

## 2019-10-31 RX ORDER — ASPIRIN 81 MG/1
81 TABLET ORAL DAILY
COMMUNITY

## 2019-10-31 RX ORDER — AMLODIPINE BESYLATE 10 MG/1
10 TABLET ORAL DAILY
COMMUNITY
End: 2019-12-02

## 2019-10-31 RX ORDER — AMOXICILLIN 500 MG
CAPSULE ORAL DAILY
COMMUNITY

## 2019-10-31 NOTE — PRE-PROCEDURE INSTRUCTIONS
Pre-Surgery Instructions:   Medication Instructions    aspirin (ECOTRIN LOW STRENGTH) 81 mg EC tablet Instructed patient per Anesthesia Guidelines   gabapentin (NEURONTIN) 300 mg capsule Instructed patient per Anesthesia Guidelines   traMADol (ULTRAM) 50 mg tablet Instructed patient per Anesthesia Guidelines   valsartan (DIOVAN) 320 MG tablet Instructed patient per Anesthesia Guidelines

## 2019-10-31 NOTE — PRE-PROCEDURE INSTRUCTIONS
Pre-Surgery Instructions:   Medication Instructions    aspirin (ECOTRIN LOW STRENGTH) 81 mg EC tablet Instructed patient per Anesthesia Guidelines   gabapentin (NEURONTIN) 300 mg capsule Instructed patient per Anesthesia Guidelines   traMADol (ULTRAM) 50 mg tablet Instructed patient per Anesthesia Guidelines   valsartan (DIOVAN) 320 MG tablet Instructed patient per Anesthesia Guidelines  Pt instructed to stop Aspirin one week prior to surgery if ok with pcp  Pt instructed to NOT take valsartan on day of surgery

## 2019-11-02 NOTE — ANESTHESIA PREPROCEDURE EVALUATION
Anesthesia Plan  ASA Score-     Anesthesia Type-   Additional Monitors:   Airway Plan:     Comment: Pain meds: gabapentin, tramadol  Tylenol and gabapentin ordered by surgeon  Consider precedex/ketamine intraop  Plan Factors-    Induction-     Postoperative Plan-     Informed Consent-           Review of Systems/Medical History  Patient summary reviewed  Chart reviewed  No history of anesthetic complications     Cardiovascular  Exercise tolerance (METS): >4,  Hypertension , No angina , No CAMPOS,    Pulmonary  Smoker ex-smoker  , No recent URI , No sleep apnea ,        GI/Hepatic            Endo/Other     GYN       Hematology   Musculoskeletal  Back pain , lumbar pain and spinal stenosis,        Neurology   Psychology           Physical Exam    Airway    Mallampati score: I  TM Distance: >3 FB  Neck ROM: full     Dental   No notable dental hx     Cardiovascular  Cardiovascular exam normal    Pulmonary  Pulmonary exam normal     Other Findings        Anesthesia Plan  ASA Score- 2     Anesthesia Type- general with ASA Monitors  Additional Monitors: arterial line  Airway Plan: ETT  Comment: Pain meds: gabapentin, tramadol  Tylenol and gabapentin ordered by surgeon  Consider precedex/ketamine intraop  Plan Factors-    Induction- intravenous  Postoperative Plan- Plan for postoperative opioid use  Planned trial extubation    Informed Consent- Anesthetic plan and risks discussed with patient  I personally reviewed this patient with the CRNA  Discussed and agreed on the Anesthesia Plan with the CRNA  Jesusita Covarrubias

## 2019-11-06 ENCOUNTER — TELEPHONE (OUTPATIENT)
Dept: OBGYN CLINIC | Facility: HOSPITAL | Age: 59
End: 2019-11-06

## 2019-11-21 ENCOUNTER — HOSPITAL ENCOUNTER (INPATIENT)
Facility: HOSPITAL | Age: 59
LOS: 2 days | Discharge: HOME | DRG: 460 | End: 2019-11-23
Attending: ORTHOPAEDIC SURGERY | Admitting: ORTHOPAEDIC SURGERY
Payer: COMMERCIAL

## 2019-11-21 ENCOUNTER — ANESTHESIA (OUTPATIENT)
Dept: PERIOP | Facility: HOSPITAL | Age: 59
DRG: 460 | End: 2019-11-21
Payer: COMMERCIAL

## 2019-11-21 ENCOUNTER — APPOINTMENT (OUTPATIENT)
Dept: RADIOLOGY | Facility: HOSPITAL | Age: 59
DRG: 460 | End: 2019-11-21
Payer: COMMERCIAL

## 2019-11-21 DIAGNOSIS — Z98.1 S/P LUMBAR FUSION: Primary | ICD-10-CM

## 2019-11-21 DIAGNOSIS — M51.9 LUMBAR DISC DISEASE: ICD-10-CM

## 2019-11-21 DIAGNOSIS — M54.16 RADICULOPATHY, LUMBAR REGION: ICD-10-CM

## 2019-11-21 DIAGNOSIS — M43.16 SPONDYLOLISTHESIS, LUMBAR REGION: ICD-10-CM

## 2019-11-21 DIAGNOSIS — I10 ESSENTIAL HYPERTENSION: ICD-10-CM

## 2019-11-21 PROCEDURE — 86923 COMPATIBILITY TEST ELECTRIC: CPT

## 2019-11-21 PROCEDURE — 86900 BLOOD TYPING SEROLOGIC ABO: CPT | Performed by: ORTHOPAEDIC SURGERY

## 2019-11-21 PROCEDURE — 72100 X-RAY EXAM L-S SPINE 2/3 VWS: CPT

## 2019-11-21 PROCEDURE — 0SB20ZZ EXCISION OF LUMBAR VERTEBRAL DISC, OPEN APPROACH: ICD-10-PCS | Performed by: ORTHOPAEDIC SURGERY

## 2019-11-21 PROCEDURE — C1713 ANCHOR/SCREW BN/BN,TIS/BN: HCPCS | Performed by: ORTHOPAEDIC SURGERY

## 2019-11-21 PROCEDURE — 22853 INSJ BIOMECHANICAL DEVICE: CPT | Performed by: ORTHOPAEDIC SURGERY

## 2019-11-21 PROCEDURE — 86901 BLOOD TYPING SEROLOGIC RH(D): CPT | Performed by: ORTHOPAEDIC SURGERY

## 2019-11-21 PROCEDURE — 4A11X4G MONITORING OF PERIPHERAL NERVOUS ELECTRICAL ACTIVITY, INTRAOPERATIVE, EXTERNAL APPROACH: ICD-10-PCS | Performed by: ORTHOPAEDIC SURGERY

## 2019-11-21 PROCEDURE — 22633 ARTHRD CMBN 1NTRSPC LUMBAR: CPT | Performed by: ORTHOPAEDIC SURGERY

## 2019-11-21 PROCEDURE — 20939 BONE MARROW ASPIR BONE GRFG: CPT | Performed by: ORTHOPAEDIC SURGERY

## 2019-11-21 PROCEDURE — 22842 INSERT SPINE FIXATION DEVICE: CPT | Performed by: ORTHOPAEDIC SURGERY

## 2019-11-21 PROCEDURE — 07DR0ZZ EXTRACTION OF ILIAC BONE MARROW, OPEN APPROACH: ICD-10-PCS | Performed by: ORTHOPAEDIC SURGERY

## 2019-11-21 PROCEDURE — 0SG10AJ FUSION OF 2 OR MORE LUMBAR VERTEBRAL JOINTS WITH INTERBODY FUSION DEVICE, POSTERIOR APPROACH, ANTERIOR COLUMN, OPEN APPROACH: ICD-10-PCS | Performed by: ORTHOPAEDIC SURGERY

## 2019-11-21 PROCEDURE — 22634 ARTHRD CMBN 1NTRSPC EA ADDL: CPT | Performed by: ORTHOPAEDIC SURGERY

## 2019-11-21 PROCEDURE — 86850 RBC ANTIBODY SCREEN: CPT | Performed by: ORTHOPAEDIC SURGERY

## 2019-11-21 PROCEDURE — 01NB0ZZ RELEASE LUMBAR NERVE, OPEN APPROACH: ICD-10-PCS | Performed by: ORTHOPAEDIC SURGERY

## 2019-11-21 PROCEDURE — 20930 SP BONE ALGRFT MORSEL ADD-ON: CPT | Performed by: ORTHOPAEDIC SURGERY

## 2019-11-21 DEVICE — BLOCKER
Type: IMPLANTABLE DEVICE | Site: SPINE LUMBAR | Status: FUNCTIONAL
Brand: XIA 3

## 2019-11-21 DEVICE — POSTERIOR LUMBAR CAGE
Type: IMPLANTABLE DEVICE | Site: SPINE LUMBAR | Status: FUNCTIONAL
Brand: TRITANIUM PL

## 2019-11-21 DEVICE — RAD ROD
Type: IMPLANTABLE DEVICE | Site: SPINE LUMBAR | Status: FUNCTIONAL
Brand: ES2 SPINAL SYSTEM

## 2019-11-21 DEVICE — POLYAXIAL SCREW
Type: IMPLANTABLE DEVICE | Site: SPINE LUMBAR | Status: FUNCTIONAL
Brand: XIA 3

## 2019-11-21 DEVICE — BONE GRAFT SUBSTITUTE, FOAM PACK, BETA-TRICALCIUM PHOSPHATE AND TYPE I BOVINE COLLAGEN
Type: IMPLANTABLE DEVICE | Site: SPINE LUMBAR | Status: FUNCTIONAL
Brand: VITOSS

## 2019-11-21 RX ORDER — HYDROMORPHONE HCL/PF 1 MG/ML
0.5 SYRINGE (ML) INJECTION
Status: DISCONTINUED | OUTPATIENT
Start: 2019-11-21 | End: 2019-11-21

## 2019-11-21 RX ORDER — OXYCODONE HYDROCHLORIDE 5 MG/1
5 TABLET ORAL EVERY 6 HOURS PRN
Qty: 30 TABLET | Refills: 0 | Status: SHIPPED | OUTPATIENT
Start: 2019-11-21 | End: 2019-12-02 | Stop reason: SDUPTHER

## 2019-11-21 RX ORDER — NEOSTIGMINE METHYLSULFATE 1 MG/ML
INJECTION INTRAVENOUS AS NEEDED
Status: DISCONTINUED | OUTPATIENT
Start: 2019-11-21 | End: 2019-11-21 | Stop reason: SURG

## 2019-11-21 RX ORDER — OXYCODONE HYDROCHLORIDE 5 MG/1
5 TABLET ORAL EVERY 4 HOURS PRN
Status: DISCONTINUED | OUTPATIENT
Start: 2019-11-21 | End: 2019-11-23 | Stop reason: HOSPADM

## 2019-11-21 RX ORDER — HYDROMORPHONE HCL 110MG/55ML
PATIENT CONTROLLED ANALGESIA SYRINGE INTRAVENOUS AS NEEDED
Status: DISCONTINUED | OUTPATIENT
Start: 2019-11-21 | End: 2019-11-21 | Stop reason: SURG

## 2019-11-21 RX ORDER — GLYCOPYRROLATE 0.2 MG/ML
INJECTION INTRAMUSCULAR; INTRAVENOUS AS NEEDED
Status: DISCONTINUED | OUTPATIENT
Start: 2019-11-21 | End: 2019-11-21 | Stop reason: SURG

## 2019-11-21 RX ORDER — LIDOCAINE HYDROCHLORIDE 10 MG/ML
INJECTION, SOLUTION INFILTRATION; PERINEURAL AS NEEDED
Status: DISCONTINUED | OUTPATIENT
Start: 2019-11-21 | End: 2019-11-21 | Stop reason: SURG

## 2019-11-21 RX ORDER — CEFAZOLIN SODIUM 1 G/3ML
INJECTION, POWDER, FOR SOLUTION INTRAMUSCULAR; INTRAVENOUS AS NEEDED
Status: DISCONTINUED | OUTPATIENT
Start: 2019-11-21 | End: 2019-11-21 | Stop reason: SURG

## 2019-11-21 RX ORDER — SODIUM CHLORIDE, SODIUM LACTATE, POTASSIUM CHLORIDE, CALCIUM CHLORIDE 600; 310; 30; 20 MG/100ML; MG/100ML; MG/100ML; MG/100ML
INJECTION, SOLUTION INTRAVENOUS CONTINUOUS PRN
Status: DISCONTINUED | OUTPATIENT
Start: 2019-11-21 | End: 2019-11-21

## 2019-11-21 RX ORDER — AMLODIPINE BESYLATE 10 MG/1
10 TABLET ORAL DAILY
Status: DISCONTINUED | OUTPATIENT
Start: 2019-11-21 | End: 2019-11-23 | Stop reason: HOSPADM

## 2019-11-21 RX ORDER — ONDANSETRON 2 MG/ML
4 INJECTION INTRAMUSCULAR; INTRAVENOUS ONCE AS NEEDED
Status: DISCONTINUED | OUTPATIENT
Start: 2019-11-21 | End: 2019-11-21 | Stop reason: HOSPADM

## 2019-11-21 RX ORDER — SODIUM CHLORIDE 9 MG/ML
INJECTION INTRAVENOUS AS NEEDED
Status: DISCONTINUED | OUTPATIENT
Start: 2019-11-21 | End: 2019-11-21 | Stop reason: HOSPADM

## 2019-11-21 RX ORDER — METHOCARBAMOL 750 MG/1
750 TABLET, FILM COATED ORAL EVERY 6 HOURS SCHEDULED
Status: DISCONTINUED | OUTPATIENT
Start: 2019-11-21 | End: 2019-11-23 | Stop reason: HOSPADM

## 2019-11-21 RX ORDER — HEPARIN SODIUM 1000 [USP'U]/ML
INJECTION, SOLUTION INTRAVENOUS; SUBCUTANEOUS AS NEEDED
Status: DISCONTINUED | OUTPATIENT
Start: 2019-11-21 | End: 2019-11-21 | Stop reason: HOSPADM

## 2019-11-21 RX ORDER — MAGNESIUM HYDROXIDE 1200 MG/15ML
LIQUID ORAL AS NEEDED
Status: DISCONTINUED | OUTPATIENT
Start: 2019-11-21 | End: 2019-11-21 | Stop reason: HOSPADM

## 2019-11-21 RX ORDER — SODIUM CHLORIDE, SODIUM LACTATE, POTASSIUM CHLORIDE, CALCIUM CHLORIDE 600; 310; 30; 20 MG/100ML; MG/100ML; MG/100ML; MG/100ML
100 INJECTION, SOLUTION INTRAVENOUS CONTINUOUS
Status: DISPENSED | OUTPATIENT
Start: 2019-11-21 | End: 2019-11-22

## 2019-11-21 RX ORDER — BUPIVACAINE HYDROCHLORIDE 2.5 MG/ML
INJECTION, SOLUTION INFILTRATION; PERINEURAL AS NEEDED
Status: DISCONTINUED | OUTPATIENT
Start: 2019-11-21 | End: 2019-11-21 | Stop reason: HOSPADM

## 2019-11-21 RX ORDER — MORPHINE SULFATE 10 MG/ML
2 INJECTION, SOLUTION INTRAMUSCULAR; INTRAVENOUS EVERY 2 HOUR PRN
Status: ACTIVE | OUTPATIENT
Start: 2019-11-21 | End: 2019-11-23

## 2019-11-21 RX ORDER — SODIUM CHLORIDE, SODIUM LACTATE, POTASSIUM CHLORIDE, CALCIUM CHLORIDE 600; 310; 30; 20 MG/100ML; MG/100ML; MG/100ML; MG/100ML
75 INJECTION, SOLUTION INTRAVENOUS CONTINUOUS
Status: DISCONTINUED | OUTPATIENT
Start: 2019-11-21 | End: 2019-11-23 | Stop reason: HOSPADM

## 2019-11-21 RX ORDER — HYDROMORPHONE HCL/PF 1 MG/ML
0.5 SYRINGE (ML) INJECTION
Status: DISCONTINUED | OUTPATIENT
Start: 2019-11-21 | End: 2019-11-21 | Stop reason: HOSPADM

## 2019-11-21 RX ORDER — CEFAZOLIN SODIUM 2 G/50ML
2000 SOLUTION INTRAVENOUS EVERY 8 HOURS
Status: DISCONTINUED | OUTPATIENT
Start: 2019-11-21 | End: 2019-11-23 | Stop reason: HOSPADM

## 2019-11-21 RX ORDER — GABAPENTIN 300 MG/1
300 CAPSULE ORAL 2 TIMES DAILY
Status: DISCONTINUED | OUTPATIENT
Start: 2019-11-21 | End: 2019-11-23 | Stop reason: HOSPADM

## 2019-11-21 RX ORDER — OXYCODONE HYDROCHLORIDE 5 MG/1
10 TABLET ORAL EVERY 4 HOURS PRN
Status: DISCONTINUED | OUTPATIENT
Start: 2019-11-21 | End: 2019-11-23 | Stop reason: HOSPADM

## 2019-11-21 RX ORDER — METHOCARBAMOL 500 MG/1
500 TABLET, FILM COATED ORAL 4 TIMES DAILY PRN
Qty: 30 TABLET | Refills: 0 | Status: SHIPPED | OUTPATIENT
Start: 2019-11-21 | End: 2019-12-02 | Stop reason: SDUPTHER

## 2019-11-21 RX ORDER — ACETAMINOPHEN 325 MG/1
975 TABLET ORAL ONCE
Status: COMPLETED | OUTPATIENT
Start: 2019-11-21 | End: 2019-11-21

## 2019-11-21 RX ORDER — ACETAMINOPHEN 325 MG/1
975 TABLET ORAL EVERY 8 HOURS
Status: DISCONTINUED | OUTPATIENT
Start: 2019-11-21 | End: 2019-11-23 | Stop reason: HOSPADM

## 2019-11-21 RX ORDER — GABAPENTIN 300 MG/1
300 CAPSULE ORAL ONCE
Status: COMPLETED | OUTPATIENT
Start: 2019-11-21 | End: 2019-11-21

## 2019-11-21 RX ORDER — ROCURONIUM BROMIDE 10 MG/ML
INJECTION, SOLUTION INTRAVENOUS AS NEEDED
Status: DISCONTINUED | OUTPATIENT
Start: 2019-11-21 | End: 2019-11-21 | Stop reason: SURG

## 2019-11-21 RX ORDER — SENNOSIDES 8.6 MG
1 TABLET ORAL DAILY
Status: DISCONTINUED | OUTPATIENT
Start: 2019-11-21 | End: 2019-11-23 | Stop reason: HOSPADM

## 2019-11-21 RX ORDER — SODIUM CHLORIDE 9 MG/ML
INJECTION, SOLUTION INTRAVENOUS CONTINUOUS PRN
Status: DISCONTINUED | OUTPATIENT
Start: 2019-11-21 | End: 2019-11-21

## 2019-11-21 RX ORDER — MIDAZOLAM HYDROCHLORIDE 2 MG/2ML
INJECTION, SOLUTION INTRAMUSCULAR; INTRAVENOUS AS NEEDED
Status: DISCONTINUED | OUTPATIENT
Start: 2019-11-21 | End: 2019-11-21 | Stop reason: SURG

## 2019-11-21 RX ORDER — PROPOFOL 10 MG/ML
INJECTION, EMULSION INTRAVENOUS AS NEEDED
Status: DISCONTINUED | OUTPATIENT
Start: 2019-11-21 | End: 2019-11-21 | Stop reason: SURG

## 2019-11-21 RX ORDER — FENTANYL CITRATE 50 UG/ML
INJECTION, SOLUTION INTRAMUSCULAR; INTRAVENOUS AS NEEDED
Status: DISCONTINUED | OUTPATIENT
Start: 2019-11-21 | End: 2019-11-21 | Stop reason: SURG

## 2019-11-21 RX ORDER — PROPOFOL 10 MG/ML
INJECTION, EMULSION INTRAVENOUS CONTINUOUS PRN
Status: DISCONTINUED | OUTPATIENT
Start: 2019-11-21 | End: 2019-11-21

## 2019-11-21 RX ORDER — EPHEDRINE SULFATE 50 MG/ML
INJECTION INTRAVENOUS AS NEEDED
Status: DISCONTINUED | OUTPATIENT
Start: 2019-11-21 | End: 2019-11-21 | Stop reason: SURG

## 2019-11-21 RX ORDER — CEFAZOLIN SODIUM 2 G/50ML
2000 SOLUTION INTRAVENOUS ONCE
Status: DISCONTINUED | OUTPATIENT
Start: 2019-11-21 | End: 2019-11-21 | Stop reason: HOSPADM

## 2019-11-21 RX ORDER — CHLORHEXIDINE GLUCONATE 0.12 MG/ML
15 RINSE ORAL ONCE
Status: COMPLETED | OUTPATIENT
Start: 2019-11-21 | End: 2019-11-21

## 2019-11-21 RX ORDER — ALBUMIN, HUMAN INJ 5% 5 %
SOLUTION INTRAVENOUS CONTINUOUS PRN
Status: DISCONTINUED | OUTPATIENT
Start: 2019-11-21 | End: 2019-11-21

## 2019-11-21 RX ORDER — SULFAMETHOXAZOLE AND TRIMETHOPRIM 800; 160 MG/1; MG/1
1 TABLET ORAL 2 TIMES DAILY
Qty: 10 TABLET | Refills: 0 | Status: SHIPPED | OUTPATIENT
Start: 2019-11-21 | End: 2019-11-26

## 2019-11-21 RX ORDER — DEXAMETHASONE SODIUM PHOSPHATE 10 MG/ML
INJECTION, SOLUTION INTRAMUSCULAR; INTRAVENOUS AS NEEDED
Status: DISCONTINUED | OUTPATIENT
Start: 2019-11-21 | End: 2019-11-21 | Stop reason: SURG

## 2019-11-21 RX ORDER — ONDANSETRON 2 MG/ML
INJECTION INTRAMUSCULAR; INTRAVENOUS AS NEEDED
Status: DISCONTINUED | OUTPATIENT
Start: 2019-11-21 | End: 2019-11-21 | Stop reason: SURG

## 2019-11-21 RX ORDER — VANCOMYCIN HYDROCHLORIDE 1 G/20ML
INJECTION, POWDER, LYOPHILIZED, FOR SOLUTION INTRAVENOUS AS NEEDED
Status: DISCONTINUED | OUTPATIENT
Start: 2019-11-21 | End: 2019-11-21 | Stop reason: HOSPADM

## 2019-11-21 RX ORDER — ONDANSETRON 2 MG/ML
4 INJECTION INTRAMUSCULAR; INTRAVENOUS EVERY 6 HOURS PRN
Status: DISCONTINUED | OUTPATIENT
Start: 2019-11-21 | End: 2019-11-23 | Stop reason: HOSPADM

## 2019-11-21 RX ORDER — DOCUSATE SODIUM 100 MG/1
100 CAPSULE, LIQUID FILLED ORAL 2 TIMES DAILY
Status: DISCONTINUED | OUTPATIENT
Start: 2019-11-21 | End: 2019-11-23 | Stop reason: HOSPADM

## 2019-11-21 RX ORDER — LOSARTAN POTASSIUM 50 MG/1
100 TABLET ORAL DAILY
Status: DISCONTINUED | OUTPATIENT
Start: 2019-11-22 | End: 2019-11-23 | Stop reason: HOSPADM

## 2019-11-21 RX ADMIN — PHENYLEPHRINE HYDROCHLORIDE 200 MCG: 10 INJECTION INTRAVENOUS at 10:49

## 2019-11-21 RX ADMIN — PHENYLEPHRINE HYDROCHLORIDE 100 MCG: 10 INJECTION INTRAVENOUS at 09:52

## 2019-11-21 RX ADMIN — PHENYLEPHRINE HYDROCHLORIDE 200 MCG: 10 INJECTION INTRAVENOUS at 07:40

## 2019-11-21 RX ADMIN — MIDAZOLAM 2 MG: 1 INJECTION INTRAMUSCULAR; INTRAVENOUS at 07:28

## 2019-11-21 RX ADMIN — ALBUMIN (HUMAN): 12.5 SOLUTION INTRAVENOUS at 08:30

## 2019-11-21 RX ADMIN — DOCUSATE SODIUM 100 MG: 100 CAPSULE, LIQUID FILLED ORAL at 17:39

## 2019-11-21 RX ADMIN — OXYCODONE HYDROCHLORIDE 10 MG: 5 TABLET ORAL at 23:06

## 2019-11-21 RX ADMIN — GABAPENTIN 300 MG: 300 CAPSULE ORAL at 05:54

## 2019-11-21 RX ADMIN — CHLORHEXIDINE GLUCONATE 0.12% ORAL RINSE 15 ML: 1.2 LIQUID ORAL at 05:59

## 2019-11-21 RX ADMIN — PHENYLEPHRINE HYDROCHLORIDE 100 MCG: 10 INJECTION INTRAVENOUS at 10:31

## 2019-11-21 RX ADMIN — PROPOFOL 200 MG: 10 INJECTION, EMULSION INTRAVENOUS at 07:37

## 2019-11-21 RX ADMIN — GABAPENTIN 300 MG: 300 CAPSULE ORAL at 17:39

## 2019-11-21 RX ADMIN — PHENYLEPHRINE HYDROCHLORIDE 100 MCG: 10 INJECTION INTRAVENOUS at 10:13

## 2019-11-21 RX ADMIN — PHENYLEPHRINE HYDROCHLORIDE 20 MCG/MIN: 10 INJECTION INTRAVENOUS at 10:59

## 2019-11-21 RX ADMIN — ROCURONIUM BROMIDE 50 MG: 50 INJECTION, SOLUTION INTRAVENOUS at 07:37

## 2019-11-21 RX ADMIN — EPHEDRINE SULFATE 10 MG: 50 INJECTION, SOLUTION INTRAVENOUS at 08:01

## 2019-11-21 RX ADMIN — SODIUM CHLORIDE, SODIUM LACTATE, POTASSIUM CHLORIDE, AND CALCIUM CHLORIDE 100 ML/HR: .6; .31; .03; .02 INJECTION, SOLUTION INTRAVENOUS at 14:22

## 2019-11-21 RX ADMIN — ACETAMINOPHEN 975 MG: 325 TABLET ORAL at 14:16

## 2019-11-21 RX ADMIN — DEXAMETHASONE SODIUM PHOSPHATE 10 MG: 10 INJECTION, SOLUTION INTRAMUSCULAR; INTRAVENOUS at 07:37

## 2019-11-21 RX ADMIN — GLYCOPYRROLATE 0.4 MG: 0.2 INJECTION, SOLUTION INTRAMUSCULAR; INTRAVENOUS at 09:36

## 2019-11-21 RX ADMIN — PHENYLEPHRINE HYDROCHLORIDE 200 MCG: 10 INJECTION INTRAVENOUS at 11:04

## 2019-11-21 RX ADMIN — CEFAZOLIN 2000 MG: 1 INJECTION, POWDER, FOR SOLUTION INTRAVENOUS at 07:40

## 2019-11-21 RX ADMIN — ACETAMINOPHEN 975 MG: 325 TABLET ORAL at 05:54

## 2019-11-21 RX ADMIN — ROCURONIUM BROMIDE 10 MG: 50 INJECTION, SOLUTION INTRAVENOUS at 08:22

## 2019-11-21 RX ADMIN — FENTANYL CITRATE 100 MCG: 50 INJECTION, SOLUTION INTRAMUSCULAR; INTRAVENOUS at 07:37

## 2019-11-21 RX ADMIN — PHENYLEPHRINE HYDROCHLORIDE 100 MCG: 10 INJECTION INTRAVENOUS at 07:43

## 2019-11-21 RX ADMIN — PHENYLEPHRINE HYDROCHLORIDE 200 MCG: 10 INJECTION INTRAVENOUS at 09:29

## 2019-11-21 RX ADMIN — HYDROMORPHONE HYDROCHLORIDE 1 MG: 2 INJECTION, SOLUTION INTRAMUSCULAR; INTRAVENOUS; SUBCUTANEOUS at 11:28

## 2019-11-21 RX ADMIN — SENNOSIDES 8.6 MG: 8.6 TABLET, FILM COATED ORAL at 17:39

## 2019-11-21 RX ADMIN — HYDROMORPHONE HYDROCHLORIDE 1 MG: 2 INJECTION, SOLUTION INTRAMUSCULAR; INTRAVENOUS; SUBCUTANEOUS at 11:47

## 2019-11-21 RX ADMIN — ONDANSETRON 4 MG: 2 INJECTION INTRAMUSCULAR; INTRAVENOUS at 11:25

## 2019-11-21 RX ADMIN — SODIUM CHLORIDE, SODIUM LACTATE, POTASSIUM CHLORIDE, AND CALCIUM CHLORIDE: .6; .31; .03; .02 INJECTION, SOLUTION INTRAVENOUS at 10:18

## 2019-11-21 RX ADMIN — ALBUMIN (HUMAN): 12.5 SOLUTION INTRAVENOUS at 10:53

## 2019-11-21 RX ADMIN — EPHEDRINE SULFATE 5 MG: 50 INJECTION, SOLUTION INTRAVENOUS at 08:34

## 2019-11-21 RX ADMIN — REMIFENTANIL HYDROCHLORIDE 0.1 MCG/KG/MIN: 1 INJECTION, POWDER, LYOPHILIZED, FOR SOLUTION INTRAVENOUS at 07:40

## 2019-11-21 RX ADMIN — SODIUM CHLORIDE, SODIUM LACTATE, POTASSIUM CHLORIDE, AND CALCIUM CHLORIDE 100 ML/HR: .6; .31; .03; .02 INJECTION, SOLUTION INTRAVENOUS at 23:09

## 2019-11-21 RX ADMIN — PHENYLEPHRINE HYDROCHLORIDE 100 MCG: 10 INJECTION INTRAVENOUS at 07:49

## 2019-11-21 RX ADMIN — EPHEDRINE SULFATE 5 MG: 50 INJECTION, SOLUTION INTRAVENOUS at 11:22

## 2019-11-21 RX ADMIN — SODIUM CHLORIDE: 0.9 INJECTION, SOLUTION INTRAVENOUS at 09:01

## 2019-11-21 RX ADMIN — METHOCARBAMOL TABLETS 750 MG: 750 TABLET, COATED ORAL at 23:06

## 2019-11-21 RX ADMIN — PHENYLEPHRINE HYDROCHLORIDE 100 MCG: 10 INJECTION INTRAVENOUS at 10:46

## 2019-11-21 RX ADMIN — CEFAZOLIN 2000 MG: 1 INJECTION, POWDER, FOR SOLUTION INTRAVENOUS at 11:40

## 2019-11-21 RX ADMIN — EPHEDRINE SULFATE 5 MG: 50 INJECTION, SOLUTION INTRAVENOUS at 09:50

## 2019-11-21 RX ADMIN — NEOSTIGMINE METHYLSULFATE 3 MG: 1 INJECTION INTRAVENOUS at 09:36

## 2019-11-21 RX ADMIN — ROCURONIUM BROMIDE 10 MG: 50 INJECTION, SOLUTION INTRAVENOUS at 08:47

## 2019-11-21 RX ADMIN — SODIUM CHLORIDE: 0.9 INJECTION, SOLUTION INTRAVENOUS at 10:43

## 2019-11-21 RX ADMIN — LIDOCAINE HYDROCHLORIDE 50 MG: 10 INJECTION, SOLUTION INFILTRATION; PERINEURAL at 07:37

## 2019-11-21 RX ADMIN — ROCURONIUM BROMIDE 10 MG: 50 INJECTION, SOLUTION INTRAVENOUS at 09:04

## 2019-11-21 RX ADMIN — SODIUM CHLORIDE, SODIUM LACTATE, POTASSIUM CHLORIDE, AND CALCIUM CHLORIDE: .6; .31; .03; .02 INJECTION, SOLUTION INTRAVENOUS at 07:20

## 2019-11-21 RX ADMIN — PROPOFOL 100 MCG/KG/MIN: 10 INJECTION, EMULSION INTRAVENOUS at 07:40

## 2019-11-21 RX ADMIN — EPHEDRINE SULFATE 5 MG: 50 INJECTION, SOLUTION INTRAVENOUS at 09:28

## 2019-11-21 RX ADMIN — PHENYLEPHRINE HYDROCHLORIDE 100 MCG: 10 INJECTION INTRAVENOUS at 11:01

## 2019-11-21 RX ADMIN — EPHEDRINE SULFATE 10 MG: 50 INJECTION, SOLUTION INTRAVENOUS at 08:13

## 2019-11-21 RX ADMIN — SODIUM CHLORIDE: 0.9 INJECTION, SOLUTION INTRAVENOUS at 07:43

## 2019-11-21 RX ADMIN — AMLODIPINE BESYLATE 10 MG: 10 TABLET ORAL at 17:39

## 2019-11-21 RX ADMIN — METHOCARBAMOL TABLETS 750 MG: 750 TABLET, COATED ORAL at 14:16

## 2019-11-21 RX ADMIN — ACETAMINOPHEN 975 MG: 325 TABLET ORAL at 23:06

## 2019-11-21 RX ADMIN — CEFAZOLIN SODIUM 2000 MG: 2 SOLUTION INTRAVENOUS at 20:34

## 2019-11-21 NOTE — ANESTHESIA POSTPROCEDURE EVALUATION
Post-Op Assessment Note    CV Status:  Stable  Pain Score: 0    Pain management: adequate     Mental Status:  Alert and awake   Hydration Status:  Euvolemic   PONV Controlled:  Controlled   Airway Patency:  Patent   Post Op Vitals Reviewed: Yes      Staff: CRNA           /64 (11/21/19 1214)    Temp 99 4 °F (37 4 °C) (11/21/19 1214)    Pulse (!) 113 (11/21/19 1214)   Resp 18 (11/21/19 1214)    SpO2   98

## 2019-11-21 NOTE — ORTHOTIC NOTE
Orthotic Note            Date: 11/21/2019      Patient Name: Abel Jordan            Reason for Consult:  Patient Active Problem List   Diagnosis    Lumbar disc disease    Spondylolisthesis, lumbar region    Radiculopathy, lumbar region   [place] delivered, fit, and donned an [employer] onto pt while sitting EOB  Sized and measured pt to a size 3  Pt tolerated fitting well  Educated pt on proper donning, doffing, and cleaning instructions  Pt currently without questions or concerns at this time  RN aware and will continue to follow up daily  Pt left supine in bed with call bell, phone, and tray within reach  LSO doffed and left at bedside  Recommendations:  Please call [employer] ext [phone] in regards to any bracing instructions and/or adjustments       [address] Restorative Technician, BS

## 2019-11-21 NOTE — PERIOPERATIVE NURSING NOTE
Alvaro luther, (homa Pratt), aware of duplicate hemovac drain orders in  As per pa, keep both hemovac drain orders in and notify the team if the hemovac drain reaches 750 ml over a shift as per order  Will continue to monitor

## 2019-11-21 NOTE — OP NOTE
OPERATIVE REPORT  PATIENT NAME: Elvira Pearce    :  1960  MRN: [id number]  Pt Location: [facility] OR ROOM 18    SURGERY DATE: 2019    Surgeon(s) and Role:     * Donald Howe MD - Primary     * Axel Harris MD - [address] [place] - Assisting    Preop Diagnosis:  Spondylolisthesis, lumbar region [M43 16]  Lumbar disc disease [M51 9]  Radiculopathy, lumbar region [M54 16]    Post-Op Diagnosis Codes: * Spondylolisthesis, lumbar region [M43 16]     * Lumbar disc disease [M51 9]     * Radiculopathy, lumbar region [M54 16]    Procedure(s) (LRB):  Revision decompression L3-L5; Instrumented posterior lateral fusion with interbody fusion L3-4, L4-5; allograft and neuromonitoring (N/A)    Specimen(s):  * No specimens in log *    Estimated Blood Loss:   750 mL    Drains:  Closed/Suction Drain Right Back Bulb 10 Fr  (Active)   Number of days: 772       Closed/Suction Drain Right Back Accordion 10 Fr  (Active)   Dressing Status Clean;Dry; Intact 2019 11:25 AM   Number of days: 0       Urethral Catheter Latex 16 Fr  (Active)   Number of days: 0       Anesthesia Type:   General    Operative Indications:  Spondylolisthesis, lumbar region [M43 16]  Lumbar disc disease [M51 9]  Radiculopathy, lumbar region [M54 16]      Operative Findings: Moderate bilateral foraminal stenosis L3-4 severe bilateral foraminal stenosis L4-5 with grade 1/2 spondylolisthesis N0-6    Complications:   None    Procedure and Technique:    Revision laminectomy decompression L3-L5  Instrumented posterior lateral fusion L3-L5 with interbody fusion L3-4, L4-5 with autograft allograft and neural monitoring  Patient was correctly identified by both the anesthesia department and myself  His back was marked  He was taken to the operating room where general anesthesia was induced in the supine position    The neural monitoring leads were placed IV antibiotics were administered preoperatively  SCDs were attached to his legs   He was then positioned prone onto a radiolucent [place] table ensuring that all bony prominences and neurovascular structures were adequately padded and protected  AP and lateral imaging demonstrated with the incision would be made overlying the old previous scar  The back was prepped and draped in the usual sterile fashion  A time-out was performed  A 10 blade was utilized to open up the old scar in the midline for length of roughly 8 cm  Dissection was carried to the level of the spinous process at L2  The dissection was then carried out laterally through the scar tissue out to the facet joints and subsequently to the transverse processes at L3, L4 and L5  A marker was placed lateral image confirmed the appropriate levels  A trocar was placed into the iliac crest and 60 cc of bone marrow aspirate was taken in preparation for the graft portion of the procedure  Instrumentation ensued  Pedicle screws were placed bilaterally at L3-L4 and L5  The sequence was as follows  Gearshift awl, ball-tip probe, tap, ball-tip probe and screw placement  Leary size 7 0 x 45 mm screws were placed bilaterally from L3-L5  No breaches were palpated  The screws were stimulated and found to be within acceptable threshold  AP and lateral imaging demonstrated appropriate instrumentation from L3-L5  The scar tissue was teased off the facet joints bilaterally at L3-4 and L4-5  Complete facetectomy was performed bilaterally at L4-5 at the level of the spondylolisthesis  The lateral aspect of the thecal sac on the left side was identified  The exiting nerve roots were traced out bilaterally and protected  The annulus was identified on the left at L4-5 annulotomy was created distraction was performed using the screws disc material was removed with pituitary rongeurs    She was were then passed into the disc space in successive increments from a size 6 up to a size 9  The endplates were prepared with the use of a curette  The disc space was packed with allograft bone  A Aimee tritanium cage measuring 8 x 23 x 6° was selected  This was packed with allograft bone and while protecting the neural elements gently malleted into place  AP and lateral imaging confirmed appropriate position of the cage  The same sequence of steps was taken at the L3-4 level with complete facetectomy on the left exposing the disc space and the exiting L3 nerve root  The nerve was protected annulotomy was created and disc material was removed  Karen were passed into the disc space followed by bone curette to prep of the endplates  A Aimee cage measuring 7 mm x 28 mm x 6° was then selected  Bone graft was packed into the space as well as in the cage  The cage was then PATY LANG  [facility] into place while protecting the neural elements  AP and lateral imaging confirmed appropriate location of the cage  Distraction was released across L3-4  The wound was then copiously irrigated with normal saline solution  Hemostasis was achieved  DuraSeal was applied over the exposed neural elements and thecal sac  The transverse processes were decorticated bilaterally with the use of a high-speed bur  Bone graft was then packed into the lateral gutters  Two rods measuring 55 mm were selected and secured in place with screw caps from L3-L5  Compression was performed at L3-4 and L4-5 and final tightening was performed  Final AP and lateral imaging demonstrated appropriate instrumentation with correction of the spondylolisthesis  Vancomycin powder was sprinkled into the deep and superficial soft tissues  A 10 [place] Hemovac drain was placed deep to the fascial layer  The fascial layer was then closed in a watertight fashion using 0 PDS suture  The subcutaneous layer was closed with 0 Vicryl followed by 2 Vicryl suture  The skin layer was closed with 2 0 nylon suture in interrupted fashion    The drain was sutured in place with 2 0 nylon suture attached to suction  Marcaine was injected into the soft tissues  Sterile dressings were applied to the wound  The patient was flipped into the supine position, extubated and then taken to recovery room in stable condition             I was present for the entire procedure    Patient Disposition:  PACU      Implants: Aimee Blancas Screws, Tritanium cages x2, Vitoss allograft 20cc        SIGNATURE: Kelsey Parmar MD  DATE: November 21, 2019  TIME: 11:49 AM

## 2019-11-21 NOTE — PLAN OF CARE
Problem: PAIN - ADULT  Goal: Verbalizes/displays adequate comfort level or baseline comfort level  Description  Interventions:  - Encourage patient to monitor pain and request assistance  - Assess pain using appropriate pain scale  - Administer analgesics based on type and severity of pain and evaluate response  - Implement non-pharmacological measures as appropriate and evaluate response  - Consider cultural and social influences on pain and pain management  - Notify physician/advanced practitioner if interventions unsuccessful or patient reports new pain  Outcome: Progressing     Problem: INFECTION - ADULT  Goal: Absence or prevention of progression during hospitalization  Description  INTERVENTIONS:  - Assess and monitor for signs and symptoms of infection  - Monitor lab/diagnostic results  - Monitor all insertion sites, i e  indwelling lines, tubes, and drains  - Monitor endotracheal if appropriate and nasal secretions for changes in amount and color  - [place] appropriate cooling/warming therapies per order  - Administer medications as ordered  - Instruct and encourage patient and family to use good hand hygiene technique  - Identify and instruct in appropriate isolation precautions for identified infection/condition  Outcome: Progressing  Goal: Absence of fever/infection during neutropenic period  Description  INTERVENTIONS:  - Monitor WBC    Outcome: Progressing     Problem: SAFETY ADULT  Goal: Patient will remain free of falls  Description  INTERVENTIONS:  - Assess patient frequently for physical needs  -  Identify cognitive and physical deficits and behaviors that affect risk of falls    -  [place] fall precautions as indicated by assessment   - Educate patient/family on patient safety including physical limitations  - Instruct patient to call for assistance with activity based on assessment  - Modify environment to reduce risk of injury  - Consider OT/PT consult to assist with strengthening/mobility  Outcome: Progressing

## 2019-11-21 NOTE — INTERVAL H&P NOTE
H&P reviewed  After examining the patient I find no changes in the patients condition since the H&P had been written  Vitals:    11/21/19 0600   BP: 163/91   Pulse: 79   Resp: 20   Temp: 98 7 °F (37 1 °C)   SpO2: 97%   Patient is seen and examined  Patient returns with complaints of ongoing radicular pain into his legs  He does have a history of post-laminectomy syndrome with resultant spondylolisthesis at L4-5 and moderate to severe bilateral foraminal stenosis at L3-4 and L4-5  Injections in the past did not provide lasting relief so he is reluctant to try more injections now  He is interested in more definitive intervention  He is a candidate for revision decompression with instrumented fusion/interbody fusion L3-4 and L4-5  Risk and benefits of the planned procedure explained in great detail and all questions answered to satisfaction    Risks discussed but not limited include bleeding, infection, CSF leak, hardware complications, nerve damage, possible persistent symptoms, possible need for reoperation

## 2019-11-21 NOTE — PERIOPERATIVE NURSING NOTE
Dr Jessa Luna md, aware of patient's tachycardia  Dr Julián De La Cruz md, ok with patient's tachycardia  Pt  Remains stable at this time  No new orders  Will continue to monitor

## 2019-11-22 LAB
ANION GAP SERPL CALCULATED.3IONS-SCNC: 8 MMOL/L (ref 4–13)
BASOPHILS # BLD AUTO: 0.01 THOUSANDS/ΜL (ref 0–0.1)
BASOPHILS NFR BLD AUTO: 0 % (ref 0–1)
BUN SERPL-MCNC: 11 MG/DL (ref 5–25)
CALCIUM SERPL-MCNC: 8.3 MG/DL (ref 8.3–10.1)
CHLORIDE SERPL-SCNC: 110 MMOL/L (ref 100–108)
CO2 SERPL-SCNC: 25 MMOL/L (ref 21–32)
CREAT SERPL-MCNC: 0.9 MG/DL (ref 0.6–1.3)
EOSINOPHIL # BLD AUTO: 0 THOUSAND/ΜL (ref 0–0.61)
EOSINOPHIL NFR BLD AUTO: 0 % (ref 0–6)
ERYTHROCYTE [DISTWIDTH] IN BLOOD BY AUTOMATED COUNT: 12.5 % (ref 11.6–15.1)
GFR SERPL CREATININE-BSD FRML MDRD: 92 ML/MIN/1.73SQ M
GLUCOSE SERPL-MCNC: 118 MG/DL (ref 65–140)
HCT VFR BLD AUTO: 29.2 % (ref 36.5–49.3)
HGB BLD-MCNC: 10 G/DL (ref 12–17)
IMM GRANULOCYTES # BLD AUTO: 0.06 THOUSAND/UL (ref 0–0.2)
IMM GRANULOCYTES NFR BLD AUTO: 1 % (ref 0–2)
LYMPHOCYTES # BLD AUTO: 1.05 THOUSANDS/ΜL (ref 0.6–4.47)
LYMPHOCYTES NFR BLD AUTO: 8 % (ref 14–44)
MCH RBC QN AUTO: 32.6 PG (ref 26.8–34.3)
MCHC RBC AUTO-ENTMCNC: 34.2 G/DL (ref 31.4–37.4)
MCV RBC AUTO: 95 FL (ref 82–98)
MONOCYTES # BLD AUTO: 1.37 THOUSAND/ΜL (ref 0.17–1.22)
MONOCYTES NFR BLD AUTO: 11 % (ref 4–12)
NEUTROPHILS # BLD AUTO: 10.31 THOUSANDS/ΜL (ref 1.85–7.62)
NEUTS SEG NFR BLD AUTO: 80 % (ref 43–75)
NRBC BLD AUTO-RTO: 0 /100 WBCS
PLATELET # BLD AUTO: 192 THOUSANDS/UL (ref 149–390)
PMV BLD AUTO: 10.1 FL (ref 8.9–12.7)
POTASSIUM SERPL-SCNC: 4.2 MMOL/L (ref 3.5–5.3)
RBC # BLD AUTO: 3.07 MILLION/UL (ref 3.88–5.62)
SODIUM SERPL-SCNC: 143 MMOL/L (ref 136–145)
WBC # BLD AUTO: 12.8 THOUSAND/UL (ref 4.31–10.16)

## 2019-11-22 PROCEDURE — NS001 PR NO SIGNATURE OR ATTESTATION: Performed by: ORTHOPAEDIC SURGERY

## 2019-11-22 PROCEDURE — G8980 MOBILITY D/C STATUS: HCPCS

## 2019-11-22 PROCEDURE — 80048 BASIC METABOLIC PNL TOTAL CA: CPT | Performed by: PHYSICIAN ASSISTANT

## 2019-11-22 PROCEDURE — G8978 MOBILITY CURRENT STATUS: HCPCS

## 2019-11-22 PROCEDURE — G8988 SELF CARE GOAL STATUS: HCPCS

## 2019-11-22 PROCEDURE — 97166 OT EVAL MOD COMPLEX 45 MIN: CPT

## 2019-11-22 PROCEDURE — G8979 MOBILITY GOAL STATUS: HCPCS

## 2019-11-22 PROCEDURE — G8989 SELF CARE D/C STATUS: HCPCS

## 2019-11-22 PROCEDURE — G8987 SELF CARE CURRENT STATUS: HCPCS

## 2019-11-22 PROCEDURE — 85025 COMPLETE CBC W/AUTO DIFF WBC: CPT | Performed by: PHYSICIAN ASSISTANT

## 2019-11-22 PROCEDURE — 97163 PT EVAL HIGH COMPLEX 45 MIN: CPT

## 2019-11-22 RX ADMIN — CEFAZOLIN SODIUM 2000 MG: 2 SOLUTION INTRAVENOUS at 04:11

## 2019-11-22 RX ADMIN — CEFAZOLIN SODIUM 2000 MG: 2 SOLUTION INTRAVENOUS at 21:37

## 2019-11-22 RX ADMIN — OXYCODONE HYDROCHLORIDE 10 MG: 5 TABLET ORAL at 12:12

## 2019-11-22 RX ADMIN — GABAPENTIN 300 MG: 300 CAPSULE ORAL at 17:13

## 2019-11-22 RX ADMIN — GABAPENTIN 300 MG: 300 CAPSULE ORAL at 09:30

## 2019-11-22 RX ADMIN — METHOCARBAMOL TABLETS 750 MG: 750 TABLET, COATED ORAL at 12:12

## 2019-11-22 RX ADMIN — AMLODIPINE BESYLATE 10 MG: 10 TABLET ORAL at 09:30

## 2019-11-22 RX ADMIN — SENNOSIDES 8.6 MG: 8.6 TABLET, FILM COATED ORAL at 09:30

## 2019-11-22 RX ADMIN — ACETAMINOPHEN 975 MG: 325 TABLET ORAL at 14:15

## 2019-11-22 RX ADMIN — CEFAZOLIN SODIUM 2000 MG: 2 SOLUTION INTRAVENOUS at 12:12

## 2019-11-22 RX ADMIN — METHOCARBAMOL TABLETS 750 MG: 750 TABLET, COATED ORAL at 05:57

## 2019-11-22 RX ADMIN — ACETAMINOPHEN 975 MG: 325 TABLET ORAL at 06:05

## 2019-11-22 RX ADMIN — DOCUSATE SODIUM 100 MG: 100 CAPSULE, LIQUID FILLED ORAL at 09:30

## 2019-11-22 RX ADMIN — METHOCARBAMOL TABLETS 750 MG: 750 TABLET, COATED ORAL at 17:13

## 2019-11-22 RX ADMIN — LOSARTAN POTASSIUM 100 MG: 50 TABLET, FILM COATED ORAL at 09:30

## 2019-11-22 RX ADMIN — DOCUSATE SODIUM 100 MG: 100 CAPSULE, LIQUID FILLED ORAL at 17:13

## 2019-11-22 NOTE — UTILIZATION REVIEW
Initial Clinical Review    Elective Inpatient surgical procedure  Age/Sex: 62 y o  male  Surgery Date: 11/21  Procedure: S/P Revision decompression L3-L5; Instrumented posterior lateral fusion with interbody fusion L3-4, L4-5; allograft and neuromonitoring (N/A)  Anesthesia: General    Admission Orders: Date/Time/Statement: Inpatient Admission Orders (From admission, onward)     Ordered        11/21/19 1217  Inpatient Admission  Once                   Orders Placed This Encounter   Procedures    Inpatient Admission     Standing Status:   Standing     Number of Occurrences:   1     Order Specific Question:   Admitting Physician     Answer:   Vladimir Brittle [[id number]]     Order Specific Question:   Level of Care     Answer:   Med Surg [[id number]]     Order Specific Question:   Estimated length of stay     Answer:   Inpatient Only Surgery     Vital Signs: /69   Pulse 101   Temp 98 8 °F (37 1 °C)   Resp 18   Ht 5' 10" (1 778 m)   Wt 78 kg (172 lb)   SpO2 98%   BMI 24 68 kg/m²      Diet: Regular  Mobility: OOB with Brace    Medications/Pain Control:   Scheduled Medications:  Medications:  acetaminophen 975 mg Oral Q8H   amLODIPine 10 mg Oral Daily   cefazolin 2,000 mg Intravenous Q8H   docusate sodium 100 mg Oral BID   gabapentin 300 mg Oral BID   losartan 100 mg Oral Daily   methocarbamol 750 mg Oral Q6H [address]   senna 1 tablet Oral Daily     Continuous IV Infusions:  lactated ringers 75 mL/hr Intravenous Continuous     PRN Meds:  morphine injection 2 mg Intravenous Q2H PRN   ondansetron 4 mg Intravenous Q6H PRN   oxyCODONE 10 mg Oral Q4H PRN  11/21 x1, 11/22 2   oxyCODONE 5 mg Oral Q4H PRN     Network Utilization Review Department  [email or web address]  ATTENTION: Please call with any questions or concerns to [phone] and carefully listen to the prompts so that you are directed to the right person   All voicemails are confidential   Rogerio Lakhani all requests for admission clinical reviews, approved or denied determinations and any other requests to dedicated fax number below belonging to the campus where the patient is receiving treatment    FACILITY NAME UR FAX NUMBER   ADMISSION DENIALS (Administrative/Medical Necessity) [address] [facility] (Maternity/NICU/Pediatrics) [phone]   [facility] [address] [facility] [address] [facility] [phone]   [place] [address] [phone]   [place] [address] [phone]

## 2019-11-22 NOTE — PLAN OF CARE
Problem: PAIN - ADULT  Goal: Verbalizes/displays adequate comfort level or baseline comfort level  Description  Interventions:  - Encourage patient to monitor pain and request assistance  - Assess pain using appropriate pain scale  - Administer analgesics based on type and severity of pain and evaluate response  - Implement non-pharmacological measures as appropriate and evaluate response  - Consider cultural and social influences on pain and pain management  - Notify physician/advanced practitioner if interventions unsuccessful or patient reports new pain  Outcome: Progressing     Problem: INFECTION - ADULT  Goal: Absence or prevention of progression during hospitalization  Description  INTERVENTIONS:  - Assess and monitor for signs and symptoms of infection  - Monitor lab/diagnostic results  - Monitor all insertion sites, i e  indwelling lines, tubes, and drains  - Monitor endotracheal if appropriate and nasal secretions for changes in amount and color  - [place] appropriate cooling/warming therapies per order  - Administer medications as ordered  - Instruct and encourage patient and family to use good hand hygiene technique  - Identify and instruct in appropriate isolation precautions for identified infection/condition  Outcome: Progressing  Goal: Absence of fever/infection during neutropenic period  Description  INTERVENTIONS:  - Monitor WBC    Outcome: Progressing     Problem: SAFETY ADULT  Goal: Patient will remain free of falls  Description  INTERVENTIONS:  - Assess patient frequently for physical needs  -  Identify cognitive and physical deficits and behaviors that affect risk of falls    -  [place] fall precautions as indicated by assessment   - Educate patient/family on patient safety including physical limitations  - Instruct patient to call for assistance with activity based on assessment  - Modify environment to reduce risk of injury  - Consider OT/PT consult to assist with strengthening/mobility  Outcome: Progressing

## 2019-11-22 NOTE — OCCUPATIONAL THERAPY NOTE
[address] Evaluation     Patient Name: Radha Alves  [id number] Date: 11/22/2019  Problem List  Principal Problem:    Spondylolisthesis, lumbar region  Active Problems:    Lumbar disc disease    Radiculopathy, lumbar region    Past Medical History  Past Medical History:   Diagnosis Date    Chronic pain     Hypertension     Lumbar disc disease      Past Surgical History  Past Surgical History:   Procedure Laterality Date    APPENDECTOMY      BACK SURGERY      HAND SURGERY Bilateral     RIGHT KNUCKLE REPLACED,  LEFT TRIGGER FINGER    LUMBAR FUSION N/A 11/21/2019    Procedure: Revision decompression L3-L5; Instrumented posterior lateral fusion with interbody fusion L3-4, L4-5; allograft and neuromonitoring;  Surgeon: Juan Jacobson MD;  Location: [facility] MA[facility];  Service: Orthopedics    IN LAMINEC/FACETECT/FORAMIN,LUMBAR 1 SEG N/A 10/10/2017    Procedure: LUMBAR LAMINECTOMY, DECOMPRESSION L3-4,L4-5;  Surgeon: Juan Jacobson MD;  Location: [facility];  Service: Orthopedics    RECTAL SURGERY           11/22/19 0816   Note Type   Note type Eval only   Restrictions/Precautions   Weight Bearing Precautions Per Order Yes   RUE Weight Bearing Per Order WBAT   LUE Weight Bearing Per Order WBAT   RLE Weight Bearing Per Order WBAT   LLE Weight Bearing Per Order WBAT   Braces or Orthoses LSO   Other Precautions Pain;Spinal precautions   Pain Assessment   Pain Assessment 0-10   Pain Score 4   Pain Type Acute pain;Surgical pain   Pain Location Back   Hospital Pain Intervention(s) Repositioned; Ambulation/increased activity; Emotional support   Home Living   Type of [address] Two level   Prior Function   Level of [place] Independent with ADLs and functional mobility   Lives With Spouse   Receives Help From Family;Friend(s)   ADL Assistance Independent   IADLs Independent   Falls in the last 6 months 0   Lifestyle   Autonomy I adls and mobility - i iadls - shares homemaking with spouse Reciprocal Relationships supportive family - reports wife will be able to assist prn   Intrinsic Gratification active pta   Subjective   Subjective "I was standing up this morning - i just had to move"   ADL   Eating Assistance 7  [place] 5  Supervision/Setup   LB Bathing Assistance 4  Minimal Assistance   [address] 5  Supervision/Setup    [facility] 4  [address]  5  Supervision/Setup   Bed Mobility   Supine to Sit 5  Supervision   Transfers   Sit to Stand 5  Supervision   Stand to Sit 5  Supervision   Stand pivot 5  Supervision   Functional Mobility   Functional Mobility 5  Supervision   Balance   Static Sitting Good   Dynamic Sitting Fair +   Static Standing Fair   Dynamic Standing Fair   Ambulatory Fair   Activity Tolerance   Activity Tolerance Patient tolerated treatment well   Medical Staff Made Aware KAYLEIGH Geiger   RUE Assessment   RUE Assessment WFL   LUE Assessment   LUE Assessment WFL   Cognition   Overall Cognitive Status WFL   Assessment   Limitation Decreased ADL status; Decreased endurance;Decreased self-care trans;Decreased high-level ADLs   Prognosis Good   Assessment Pt is a 62 y o  male who was [facility]/21/2019 with Spondylolisthesis, lumbar region   Pt's problem list also includes PMH of HTN, previous surgery and chronic pain, lumbar disc disease  At baseline pt was completing adls and mobility independently - I iadls - shares homemaking with spouse  Pt lives with spouse in 2 story home ([facility])  Currently pt requires min assist for overall ADLS and sba for functional mobility/transfers  Pt currently presents with impairments in the following categories -difficulty performing ADLS and difficulty performing IADLS  activity tolerance, endurance and standing balance/tolerance   These impairments, as well as pt's fatigue, pain and spinal precautions limit pt's ability to safely engage in all baseline areas of occupation, includingbathing, dressing, functional mobility/transfers, community mobility, laundry , driving, house maintenance, meal prep, cleaning, work/volunteer work , social participation  and leisure activities  From OT standpoint, recommend home with family support upon D/C - reviewed spinal precautions, management of LSO and use of proper body mechanics during functional tasks with good understanding - No further acute OT needs indicated at this time - Recommend continued oob for meals, ambulation to/from BR, setup for self care tasks and mobility in hallway with nursing/restorative - d/c from caseload with above recommendations   Goals   Patient Goals go home    Plan   OT Frequency Eval only   Recommendation   OT Discharge Recommendation Home with family support   OT - OK to Discharge Yes   B[place] Index   Feeding 10   Bathing 0   Grooming Score 5   Dressing Score 5   Bladder Score 10   Bowels Score 10   Toilet Use Score 10   Transfers (Bed/Chair) Score 15   Mobility (Level Surface) Score 10   Stairs Score 5   Barthel Index Score 80     [place], [place]

## 2019-11-22 NOTE — PHYSICAL THERAPY NOTE
PHYSICAL THERAPY EVALUATION  NAME:  Tom [place]  DATE: 11/22/19    AGE:   62 y o    Mrn:   [id number]  ADMIT DX:  Spondylolisthesis, lumbar region [M43 16]  Lumbar disc disease [M51 9]  Radiculopathy, lumbar region [M54 16]    Past Medical History:   Diagnosis Date    Chronic pain     Hypertension     Lumbar disc disease        Past Surgical History:   Procedure Laterality Date    APPENDECTOMY      BACK SURGERY      HAND SURGERY Bilateral     RIGHT KNUCKLE REPLACED,  LEFT TRIGGER FINGER    TN LAMINEC/FACETECT/FORAMIN,LUMBAR 1 SEG N/A 10/10/2017    Procedure: LUMBAR LAMINECTOMY, DECOMPRESSION L3-4,L4-5;  Surgeon: Emily Farias MD;  [facility]: [facility];  Service: Orthopedics    RECTAL SURGERY         Length Of Stay: 1    PHYSICAL THERAPY EVALUATION:        11/22/19 0815   Note Type   Note type Eval only   Pain Assessment   Pain Assessment 0-10   Pain Score 4   Pain Type Acute pain;Surgical pain   Pain Location Back   Pain Orientation Bilateral   Pain Descriptors Aching   Pain Frequency Constant/continuous   Pain Onset Ongoing   Clinical Progression Gradually improving   Effect of Pain on Daily Activities increased pain with activity    Patient's Stated Pain Goal No pain   Hospital Pain Intervention(s) Ambulation/increased activity;Repositioned   Response to Interventions tolerated    Home Living   Type of Home House   Home Layout Two level  (0 RIKA, bi level home, 8 steps between levels )   Home Equipment   (none as per pt )   Additional Comments Pt reports living with spouse who is able to assist pt if needed    Prior Function   Level of [place] Independent with ADLs and functional mobility   Lives With Spouse   Receives Help From Family;Friend(s)   ADL Assistance Independent   Falls in the last 6 months 0   Comments Pt denies the use of an AD for ambulation PTA    Restrictions/Precautions   Weight Bearing Precautions Per Order Yes   RUE Weight Bearing Per Order WBAT   LUE Weight Bearing Per Order WBAT   RLE Weight Bearing Per Order WBAT   LLE Weight Bearing Per Order WBAT   Braces or Orthoses LSO   Other Precautions Pain;Spinal precautions  (hemovac drain )   General   Family/Caregiver Present No   Cognition   Overall Cognitive Status WFL   Arousal/Participation Alert   Orientation Level Oriented X4   Memory Within functional limits   Following Commands Follows one step commands without difficulty   RUE Assessment   RUE Assessment WFL   LUE Assessment   LUE Assessment WFL   RLE Assessment   RLE Assessment WFL   Strength RLE   RLE Overall Strength 4+/5   LLE Assessment   LLE Assessment WFL   Strength LLE   LLE Overall Strength 4+/5   Bed Mobility   Supine to Sit 5  Supervision   Transfers   Sit to Stand 5  Supervision   Additional items Increased time required   Stand to Sit 5  Supervision   Additional items Increased time required   Ambulation/Elevation   Gait pattern Short stride; Foward flexed   Gait Assistance 5  Supervision   Assistive Device None   Distance 80ft x 2    Stair Management Assistance 5  Supervision   Stair Management Technique One rail R   Number of Stairs 4   Balance   Static Sitting Good   Static Standing Fair +   Ambulatory Fair +   Endurance Deficit   Endurance Deficit No   Activity Tolerance   Activity Tolerance Patient tolerated treatment well   Medical Staff Made Aware Zoie Nichols OT    Nurse Made Aware Pt apporpriate to be seen and mobilize per nsg    Assessment   Prognosis Good   Problem List Decreased strength;Decreased range of motion;Pain;Orthopedic restrictions   Assessment Pt is 62 y o  male seen for PT evaluation s/p admit to [facility] on 11/21/2019  Two pt identifiers were used to confirm  Pt presented s/p Revision decompression L3-L5; Instrumented posterior lateral fusion with interbody fusion L3-4, L4-5; allograft and neuromonitoring (N/A) which was performed on 11/21/2019   Pt was admitted with a primary dx of: spondylolisthesis, lumbar region    PT now consulted for assessment of mobility and d/c needs  Pt with OOB with brace  orders  Pts current co morbidities effecting treatment include: chronic pain, HTN and personal factors including steps to manage at home  Pts current clinical presentation is Unstable/ Unpredictable (high complexity) due to Ongoing medical management for primary dx, Fall risk, Spinal precautions at current time, Continuous pulse oximetry monitoring  , hemovac at current time   Prior to admission, pt was I with ambulation without the use of an AD as per pt  Upon evaluation, pt currently is requiring S for bed mobility; S for transfers and S for ambulation w/ no AD   Pt denies any lightheadedness or dizziness with ambulation  Pt presents at PT eval functioning below baseline and currently w/ overall mobility deficits 2* to: decreased endurance, pain, orthopedic restrictions  At conclusion of PT session pt returned back in chair with phone and call bell within reach  Pt denies any further questions at this time  PT is currently recommending home with family support, OPPT when appropriate  Pt/ family agreeable to plan and goals as stated on evaluation  D/C acute care PT at this time due to pt being at/ near baseline in terms of functional mobility  Pt denies any mobility or safety concerns about returning home   Barriers to Discharge None   Barriers to Discharge Comments Pt denies any mobility or safety concerns about returning home    Goals   Patient Goals " to go home"   Recommendation   Recommendation Home with family support; Outpatient PT   Equipment Recommended   (none at this time)   PT - OK to Discharge Yes  (when medically cleared )   Modified [place] Scale   Modified [place] Scale 3   Barthel Index   Feeding 10   Bathing 5   Grooming Score 5   Dressing Score 5   Bladder Score 10   Bowels Score 10   Toilet Use Score 10   Transfers (Bed/Chair) Score 15   Mobility (Level Surface) Score 10   Stairs Score 10   Barthel Index Score 90   Jordan Luong, PT

## 2019-11-22 NOTE — SOCIAL WORK
CM met with the patient at bedside to review the CM role and discuss possible dc needs  At time of interview pt is AAOx4 lives in a bilevel home with 8 RIKA in [place], [place]  Pt was IPTA with all ADL's works and drives  Pt has no DME and ambulates independently  Pt denies any history of drug/etoh abuse, mental illness or inpatient psych admissions  Pt denies any history of SNF/Rehab or VNA  Pt does have a LW but does not have a POA  [facility]  Contact: Payton Turner (wife) [phone]  PCP: Dr Ozuna    CM reviewed d/c planning process including the following: identifying help at home, patient preference for d/c planning needs, Discharge Lounge, [facility] Meds to Bed program, availability of treatment team to discuss questions or concerns patient and/or family may have regarding understanding medications and recognizing signs and symptoms once discharged  CM also encouraged patient to follow up with all recommended appointments after discharge  Patient advised of importance for patient and family to participate in managing patients medical well being

## 2019-11-22 NOTE — PROGRESS NOTES
Subjective: No acute events overnight  No acute distress  Objective:  A 10 point ROS was performed; negative except as noted above  Lab Results   Component Value Date/Time    WBC 12 80 (H) 11/22/2019 05:18 AM    HGB 10 0 (L) 11/22/2019 05:18 AM       Vitals:    11/22/19 0311   BP: 108/70   Pulse: 83   Resp: 17   Temp: 98 3 °F (36 8 °C)   SpO2: 98%     Musculoskeletal: lumbar spine  Dressing C/D/I  Motor and sensation intact L3-S1  HVx1  Leg WWP    Assessment: 62 y o  male post op day #1 from revision lumbar decompression and instrumented posterior fusion L3-L5 with interbody L3-L4 and L4-L5      Plan:  WBAT all extremities in LSO brace when OOB  Drain monitoring  Pain control  DVT ppx mechanical  PT/OT  Patient noted to have acute blood loss anemia due to a drop in Hbg of > 2 0g from preop levels, will monitor vital signs and resuscitate with IV fluids as needed  Dispo: Ortho will follow

## 2019-11-23 VITALS
OXYGEN SATURATION: 99 % | BODY MASS INDEX: 24.62 KG/M2 | WEIGHT: 172 LBS | DIASTOLIC BLOOD PRESSURE: 67 MMHG | RESPIRATION RATE: 18 BRPM | TEMPERATURE: 98.5 F | SYSTOLIC BLOOD PRESSURE: 121 MMHG | HEIGHT: 70 IN | HEART RATE: 87 BPM

## 2019-11-23 LAB
ANION GAP SERPL CALCULATED.3IONS-SCNC: 6 MMOL/L (ref 4–13)
BASOPHILS # BLD AUTO: 0.02 THOUSANDS/ΜL (ref 0–0.1)
BASOPHILS NFR BLD AUTO: 0 % (ref 0–1)
BUN SERPL-MCNC: 14 MG/DL (ref 5–25)
CALCIUM SERPL-MCNC: 8.5 MG/DL (ref 8.3–10.1)
CHLORIDE SERPL-SCNC: 107 MMOL/L (ref 100–108)
CO2 SERPL-SCNC: 25 MMOL/L (ref 21–32)
CREAT SERPL-MCNC: 1.01 MG/DL (ref 0.6–1.3)
EOSINOPHIL # BLD AUTO: 0.03 THOUSAND/ΜL (ref 0–0.61)
EOSINOPHIL NFR BLD AUTO: 0 % (ref 0–6)
ERYTHROCYTE [DISTWIDTH] IN BLOOD BY AUTOMATED COUNT: 12.8 % (ref 11.6–15.1)
GFR SERPL CREATININE-BSD FRML MDRD: 80 ML/MIN/1.73SQ M
GLUCOSE SERPL-MCNC: 131 MG/DL (ref 65–140)
HCT VFR BLD AUTO: 28.7 % (ref 36.5–49.3)
HGB BLD-MCNC: 9.8 G/DL (ref 12–17)
IMM GRANULOCYTES # BLD AUTO: 0.05 THOUSAND/UL (ref 0–0.2)
IMM GRANULOCYTES NFR BLD AUTO: 0 % (ref 0–2)
LYMPHOCYTES # BLD AUTO: 1.56 THOUSANDS/ΜL (ref 0.6–4.47)
LYMPHOCYTES NFR BLD AUTO: 14 % (ref 14–44)
MCH RBC QN AUTO: 32.8 PG (ref 26.8–34.3)
MCHC RBC AUTO-ENTMCNC: 34.1 G/DL (ref 31.4–37.4)
MCV RBC AUTO: 96 FL (ref 82–98)
MONOCYTES # BLD AUTO: 1.43 THOUSAND/ΜL (ref 0.17–1.22)
MONOCYTES NFR BLD AUTO: 13 % (ref 4–12)
NEUTROPHILS # BLD AUTO: 8.13 THOUSANDS/ΜL (ref 1.85–7.62)
NEUTS SEG NFR BLD AUTO: 73 % (ref 43–75)
NRBC BLD AUTO-RTO: 0 /100 WBCS
PLATELET # BLD AUTO: 210 THOUSANDS/UL (ref 149–390)
PMV BLD AUTO: 10.9 FL (ref 8.9–12.7)
POTASSIUM SERPL-SCNC: 3.6 MMOL/L (ref 3.5–5.3)
RBC # BLD AUTO: 2.99 MILLION/UL (ref 3.88–5.62)
SODIUM SERPL-SCNC: 138 MMOL/L (ref 136–145)
WBC # BLD AUTO: 11.22 THOUSAND/UL (ref 4.31–10.16)

## 2019-11-23 PROCEDURE — 80048 BASIC METABOLIC PNL TOTAL CA: CPT | Performed by: PHYSICIAN ASSISTANT

## 2019-11-23 PROCEDURE — 85025 COMPLETE CBC W/AUTO DIFF WBC: CPT | Performed by: PHYSICIAN ASSISTANT

## 2019-11-23 PROCEDURE — 99024 POSTOP FOLLOW-UP VISIT: CPT | Performed by: ORTHOPAEDIC SURGERY

## 2019-11-23 PROCEDURE — NC001 PR NO CHARGE: Performed by: ORTHOPAEDIC SURGERY

## 2019-11-23 RX ADMIN — ACETAMINOPHEN 975 MG: 325 TABLET ORAL at 07:19

## 2019-11-23 RX ADMIN — LOSARTAN POTASSIUM 100 MG: 50 TABLET, FILM COATED ORAL at 09:12

## 2019-11-23 RX ADMIN — AMLODIPINE BESYLATE 10 MG: 10 TABLET ORAL at 09:13

## 2019-11-23 RX ADMIN — OXYCODONE HYDROCHLORIDE 10 MG: 5 TABLET ORAL at 12:27

## 2019-11-23 RX ADMIN — SENNOSIDES 8.6 MG: 8.6 TABLET, FILM COATED ORAL at 09:13

## 2019-11-23 RX ADMIN — METHOCARBAMOL TABLETS 750 MG: 750 TABLET, COATED ORAL at 12:27

## 2019-11-23 RX ADMIN — GABAPENTIN 300 MG: 300 CAPSULE ORAL at 09:13

## 2019-11-23 RX ADMIN — DOCUSATE SODIUM 100 MG: 100 CAPSULE, LIQUID FILLED ORAL at 09:13

## 2019-11-23 RX ADMIN — METHOCARBAMOL TABLETS 750 MG: 750 TABLET, COATED ORAL at 00:20

## 2019-11-23 RX ADMIN — CEFAZOLIN SODIUM 2000 MG: 2 SOLUTION INTRAVENOUS at 05:26

## 2019-11-23 RX ADMIN — METHOCARBAMOL TABLETS 750 MG: 750 TABLET, COATED ORAL at 05:26

## 2019-11-23 NOTE — DISCHARGE INSTRUCTIONS
Discharge Instruction - Fady Harrison 62 y o  male MRN: [id number]  Unit/Bed#: [facility] 12    Weight Bearing Status:                                           Full weight bearing must wear back brace at all times while out of bed    DVT prophylaxis:  DO NOT take blood thinners until cleared by surgeon - may resume Aspirin and fish oil on post-operative day #5    Pain:  Continue analgesics as directed  · Tylenol 1000 mg every 8 hours for mild to moderate pain  · Oxycodone 5 mg every 4 hours for severe pain    Showering Instructions:   Do not shower until 2-3 days after the procedure  Dressing Instructions:   Keep surgical incision clean and dry at all times  A dry atmosphere helps the incision heal   No soaking or scrubbing of wound at any time  No ointment or soap on the incision  May change dressing in 2-3 days, apply dry bandage  OK to change dressing as needed if saturated  Driving Instructions:  No driving until cleared by Orthopaedic Surgery  PT/OT:  No PT/OT required until directed by surgeon at followup appointment    Appt Instructions: If you do not have your appointment, please call the clinic at [phone]  Otherwise followup as scheduled below: Follow-up with Dr Giuliana Gaspar in the office in 2 weeks  Contact the office sooner if you experience any increased numbness/tingling in the extremities  Miscellaneous:  No lifting greater than 5 lbs  No strenuous exercise  No repetitive bending or twisting

## 2019-11-23 NOTE — PROGRESS NOTES
Patients hemovac has small tear in lining which is now leaking small amount; tegaderm x2 applied  Ortho paged to be made aware  Awaiting call back

## 2019-11-23 NOTE — DISCHARGE SUMMARY
ORTHOPEDICS DISCHARGE SUMMARY  Milton Richardson 62 y o  male MRN: [id number]  Unit/Bed#: [place] 613-01    Attending Physician: Marquis Mcdaniel    Admitting diagnosis: Spondylolisthesis, lumbar region [M43 16]  Lumbar disc disease [M51 9]  Radiculopathy, lumbar region [M54 16]    Discharge diagnosis: Spondylolisthesis, lumbar region [M43 16]  Lumbar disc disease [M51 9]  Radiculopathy, lumbar region [M54 16]    Date of admission: 11/21/2019    Date of discharge: 11/23/19    Procedure: Revision lumbar decompression and instrumented posterior fusion L3-5 with interbody at L3-4, L4-5    HPI:  This is a 62y o  year old male that presented to the office with signs and symptoms of lumbar radiculopathy   They tried and failed conservative treatment measures and wished to proceed with surgical intervention  The risks, benefits, and complications of the procedure were discussed with the patient and informed consent was obtained  Hospital Course: The patient was admitted to the hospital on 11/21/2019 and underwent an uncomplicated Revision lumbar decompression and instrumented posterior fusion L3-5 with interbody at L3-4, L4-5  They were transferred to the floor after a brief stay in the post-anesthesia care unit  Their pain was well managed with IV and oral pain medications  They began therapy on post operative day #1  Drain pulled on post op day 2  Daily discussion was had with the patient, nursing staff, orthopaedic team, and family members if present  All questions were answered to the patients satisfaction  0   Lab Value Date/Time    HGB 9 8 (L) 11/23/2019 0512    HGB 10 0 (L) 11/22/2019 0518    HGB 16 2 10/17/2019 0624    HGB 12 8 10/11/2017 0539    HGB 15 7 09/28/2017 1346       Greater than 2 gram drop which qualifies for diagnosis of acute blood loss anemia  Vital signs remained stable and pt was resuscitated with IVF as needed   Body mass index is 24 68 kg/m²  Discarge Instructions:   The patient was discharged weight bearing as tolerated to all extremities  Take pain medications as instructed  Patient must keep LSO brace on when out of bed  Discharge Medications: For the complete list of discharge medications, please refer to the patient's medication reconciliation

## 2019-11-23 NOTE — PLAN OF CARE
Problem: PAIN - ADULT  Goal: Verbalizes/displays adequate comfort level or baseline comfort level  Description  Interventions:  - Encourage patient to monitor pain and request assistance  - Assess pain using appropriate pain scale  - Administer analgesics based on type and severity of pain and evaluate response  - Implement non-pharmacological measures as appropriate and evaluate response  - Consider cultural and social influences on pain and pain management  - Notify physician/advanced practitioner if interventions unsuccessful or patient reports new pain  11/23/2019 1334 by Celm Mendez RN  Outcome: Adequate for Discharge  11/23/2019 0823 by Clem Mendez RN  Outcome: Progressing     Problem: INFECTION - ADULT  Goal: Absence or prevention of progression during hospitalization  Description  INTERVENTIONS:  - Assess and monitor for signs and symptoms of infection  - Monitor lab/diagnostic results  - Monitor all insertion sites, i e  indwelling lines, tubes, and drains  - Monitor endotracheal if appropriate and nasal secretions for changes in amount and color  - [place] appropriate cooling/warming therapies per order  - Administer medications as ordered  - Instruct and encourage patient and family to use good hand hygiene technique  - Identify and instruct in appropriate isolation precautions for identified infection/condition  11/23/2019 1334 by Clem Mendez RN  Outcome: Adequate for Discharge  11/23/2019 0823 by Clem Mendez RN  Outcome: Progressing  Goal: Absence of fever/infection during neutropenic period  Description  INTERVENTIONS:  - Monitor WBC    11/23/2019 1334 by Clem Mendez RN  Outcome: Adequate for Discharge  11/23/2019 0823 by Clem Mendez RN  Outcome: Progressing     Problem: SAFETY ADULT  Goal: Patient will remain free of falls  Description  INTERVENTIONS:  - Assess patient frequently for physical needs  -  Identify cognitive and physical deficits and behaviors that affect risk of falls    -  [place] fall precautions as indicated by assessment   - Educate patient/family on patient safety including physical limitations  - Instruct patient to call for assistance with activity based on assessment  - Modify environment to reduce risk of injury  - Consider OT/PT consult to assist with strengthening/mobility  11/23/2019 1334 by Nilsa Vazquez, RN  Outcome: Adequate for Discharge  11/23/2019 0823 by Nilsa Vazquez RN  Outcome: Progressing

## 2019-11-23 NOTE — PROGRESS NOTES
Subjective: No events overnight  Pain controlled  Objective:  A 10 point ROS was performed; negative except as noted above  Lab Results   Component Value Date/Time    WBC 11 22 (H) 11/23/2019 05:12 AM    HGB 9 8 (L) 11/23/2019 05:12 AM       Vitals:    11/23/19 0108   BP:    Pulse:    Resp:    Temp: 99 5 °F (37 5 °C)   SpO2:      Musculoskeletal: lumbar spine  Dressing C/D/I   HVx1  Motor and sensation intact L3-S1  Palpable DP pulses    Assessment: 62 y o  male post op day #2 from revision lumbar decompression and instrumented posterior fusion L3-L5 with interbody L3-L4 and L4-L5      Plan:  WBAT in LSO with out of bed  Drain pull today  Pain control  DVT ppx mechanical  PT/OT  Patient noted to have acute blood loss anemia due to a drop in Hbg of > 2 0g from preop levels, will monitor vital signs and resuscitate with IV fluids as needed  Dispo: pending pain control after drain pull

## 2019-11-24 NOTE — UTILIZATION REVIEW
Notification of Discharge  This is a Notification of Discharge from our facility [address]  Please be advised that this patient has been discharge from our facility  Below you will find the admission and discharge date and time including the patients disposition  PRESENTATION DATE: 11/21/2019  5:16 AM  OBS ADMISSION DATE:   IP ADMISSION DATE: 11/21/19 1217   DISCHARGE DATE: 11/23/2019  1:36 PM  DISPOSITION: Home/Self Care Home/Self Care   Admission Orders listed below:  Admission Orders (From admission, onward)     Ordered        11/21/19 1217  Inpatient Admission  Once                   Please contact the UR Department if additional information is required to close this patient's authorization/case  [address] Utilization Review Department  Main: [phone] x carefully listen to the prompts  All voicemails are confidential   [email or web address]  Send all requests for admission clinical reviews, approved or denied determinations and any other requests to dedicated fax number below belonging to the campus where the patient is receiving treatment    List of dedicated fax numbers:  [address] DENIALS (Administrative/Medical Necessity) [phone]   [address] (Maternity/NICU/Pediatrics) [phone]   [facility] [phone]   [facility] [phone]   [facility] [phone]   [address] [facility] 15[address] [facility] [phone]   Nia Perry [address] [place] [phone]

## 2019-11-25 LAB
ABO GROUP BLD BPU: NORMAL
ABO GROUP BLD BPU: NORMAL
BPU ID: NORMAL
BPU ID: NORMAL
CROSSMATCH: NORMAL
CROSSMATCH: NORMAL
UNIT DISPENSE STATUS: NORMAL
UNIT DISPENSE STATUS: NORMAL
UNIT PRODUCT CODE: NORMAL
UNIT PRODUCT CODE: NORMAL
UNIT RH: NORMAL
UNIT RH: NORMAL

## 2019-12-02 ENCOUNTER — TELEPHONE (OUTPATIENT)
Dept: OBGYN CLINIC | Facility: HOSPITAL | Age: 59
End: 2019-12-02

## 2019-12-02 ENCOUNTER — HOSPITAL ENCOUNTER (OUTPATIENT)
Dept: RADIOLOGY | Facility: HOSPITAL | Age: 59
Discharge: HOME | End: 2019-12-02
Payer: COMMERCIAL

## 2019-12-02 ENCOUNTER — OFFICE VISIT (OUTPATIENT)
Dept: OBGYN CLINIC | Facility: HOSPITAL | Age: 59
End: 2019-12-02

## 2019-12-02 VITALS
BODY MASS INDEX: 24.62 KG/M2 | SYSTOLIC BLOOD PRESSURE: 98 MMHG | WEIGHT: 172 LBS | HEART RATE: 108 BPM | HEIGHT: 70 IN | DIASTOLIC BLOOD PRESSURE: 62 MMHG

## 2019-12-02 DIAGNOSIS — Z98.1 S/P LUMBAR FUSION: ICD-10-CM

## 2019-12-02 DIAGNOSIS — Z48.89 AFTERCARE FOLLOWING SURGERY: ICD-10-CM

## 2019-12-02 DIAGNOSIS — Z98.1 S/P LUMBAR FUSION: Primary | ICD-10-CM

## 2019-12-02 PROCEDURE — 72100 X-RAY EXAM L-S SPINE 2/3 VWS: CPT

## 2019-12-02 PROCEDURE — 99024 POSTOP FOLLOW-UP VISIT: CPT | Performed by: PHYSICIAN ASSISTANT

## 2019-12-02 RX ORDER — OXYCODONE HYDROCHLORIDE 5 MG/1
5 TABLET ORAL EVERY 6 HOURS PRN
Qty: 30 TABLET | Refills: 0 | Status: SHIPPED | OUTPATIENT
Start: 2019-12-02 | End: 2019-12-02 | Stop reason: SDUPTHER

## 2019-12-02 RX ORDER — CEPHALEXIN 500 MG/1
500 CAPSULE ORAL EVERY 8 HOURS SCHEDULED
Qty: 21 CAPSULE | Refills: 0 | Status: SHIPPED | OUTPATIENT
Start: 2019-12-02 | End: 2019-12-09

## 2019-12-02 RX ORDER — TELMISARTAN 80 MG/1
TABLET ORAL
COMMUNITY
Start: 2019-11-27

## 2019-12-02 RX ORDER — METHOCARBAMOL 500 MG/1
500 TABLET, FILM COATED ORAL 4 TIMES DAILY PRN
Qty: 30 TABLET | Refills: 0 | Status: SHIPPED | OUTPATIENT
Start: 2019-12-02

## 2019-12-02 RX ORDER — AMLODIPINE BESYLATE 5 MG/1
TABLET ORAL
COMMUNITY
Start: 2019-11-27

## 2019-12-02 RX ORDER — OXYCODONE HYDROCHLORIDE 5 MG/1
5 TABLET ORAL EVERY 6 HOURS PRN
Qty: 30 TABLET | Refills: 0 | Status: SHIPPED | OUTPATIENT
Start: 2019-12-02

## 2019-12-02 NOTE — PROGRESS NOTES
Assessment/Plan:    Patient seen and examined with Dr Gela Dias  He is under two weeks status post revision decompression L3-L5, instrumented posterior lateral fusion with interbody fusion L3-4, L4-5 done on 11/21/2019  Overall the patient is doing well and notes almost complete resolution of preoperative right lower extremity radicular symptoms  X-rays today demonstrate intact hardware, in good alignment  Lumbar incision demonstrates mild serosanguineous drainage from the middle portion of the incision  Some proximal and distal sutures were removed with the middle portion retained  We will send Keflex to his pharmacy for seven days  Refill of pain medications provided  He is to continue LSO brace when out of bed  Maintain lumbar spine precautions  Follow-up in one week for wound check and potential suture removal if wound is healed  He was advised on daily dressing changes with gauze and paper tape, and as needed if dressing is saturated  Problem List Items Addressed This Visit        Other    S/P lumbar fusion - Primary    Relevant Orders    XR spine lumbar 2 or 3 views injury    Aftercare following surgery            Subjective:      Patient ID: Mati Nguyen is a 62 y o  male  HPI     The patient is a 57-year-old male who presents for a postop visit  He is under two weeks status post revision decompression L3-L5, instrumented posterior lateral fusion with interbody fusion L3-4, L4-5 done on 11/21/2019  Today, the patient states that his right lower extremity preoperative symptoms are significantly improved and almost resolved  He denies any significant lower back pain or spasms however the medication is helping  He states he has had serosanguineous drainage from the middle portion of his lumbar incision over the last week without overt signs of pus drainage, fever chills or constitutional symptoms  He has been doing dressing changes as needed    He denies any any other complaints at today's visit  The following portions of the patient's history were reviewed and updated as appropriate: allergies, current medications, past family history, past medical history, past social history, past surgical history and problem list     Review of Systems      Objective:      BP 98/62   Pulse (!) 108   Ht 5' 10" (1 778 m)   Wt 78 kg (172 lb)   BMI 24 68 kg/m²          Physical Exam        No acute distress  Gait is without assistance  Proximal and distal portion of lumbar incision is clean dry intact without drainage erythema or wound dehiscence  Middle portion of incision demonstrates serosanguineous drainage  Negative modified straight leg raise bilaterally  Grossly motor and sensory stable L2-S1 bilaterally

## 2019-12-02 NOTE — TELEPHONE ENCOUNTER
Patient sees Dr Deidra Nash  Patient is calling because he went to [facility] to  his prescriptions and the states they did not receive the one for Oxycodone   Can this be resent or called in?      CB: [phone]

## 2019-12-09 ENCOUNTER — EVALUATION (OUTPATIENT)
Dept: PHYSICAL THERAPY | Facility: CLINIC | Age: 59
End: 2019-12-09
Payer: COMMERCIAL

## 2019-12-09 DIAGNOSIS — Z98.1 S/P LUMBAR FUSION: ICD-10-CM

## 2019-12-09 PROCEDURE — 97112 NEUROMUSCULAR REEDUCATION: CPT | Performed by: PHYSICAL THERAPIST

## 2019-12-09 PROCEDURE — 97162 PT EVAL MOD COMPLEX 30 MIN: CPT | Performed by: PHYSICAL THERAPIST

## 2019-12-09 PROCEDURE — 97110 THERAPEUTIC EXERCISES: CPT | Performed by: PHYSICAL THERAPIST

## 2019-12-09 NOTE — PROGRESS NOTES
PT Evaluation     Today's date: 2019  Patient name: Sonja Woo  : 1960  MRN: [id number]  Referring provider: Dat Garcia MD  Dx:   Encounter Diagnosis     ICD-10-CM    1  S/P lumbar fusion Z98 1 Ambulatory referral to Physical Therapy                  Assessment  Assessment details: Sonja Woo is a 62 y o  male presents with signs and symptoms consistent with:   S/P lumbar fusion    Senait Juárez has the above listed impairments and will benefit from skilled PT to address deficits to return to prior level of function       Impairments: abnormal coordination, abnormal gait, abnormal muscle firing, abnormal muscle tone, abnormal or restricted ROM, abnormal movement, activity intolerance, impaired balance, impaired physical strength, lacks appropriate home exercise program, pain with function and weight-bearing intolerance  Understanding of Dx/Px/POC: good   Prognosis: good    Goals  Impairment Goals 4-6 weeks  - Decrease pain to <3/10  - Improve lumbar AROM to >75% throughout  - Increase hip strength to 4/5 throughout  - Increase core strength to be able to sit up with minimal pain    Functional Goals 6-8 weeks  - Return to Prior Level of Function  - Increase Functional Status Measure (FOTO) to: >75  - Patient will be independent with HEP  - Patient will be able to sit without increased pain/compensation/difficulty  - Patient will be able to perform sit to stand without increased pain/compensation/difficulty   - Patient will be able to bend forward without increased pain/compensation/difficulty   - Patient will be able to lift >15 pounds with proper mechanics and no pain      Plan  Patient would benefit from: skilled PT  Referral necessary: No  Planned modality interventions: cryotherapy, electrical stimulation/Russian stimulation, H-Wave, TENS, thermotherapy: hydrocollator packs and unattended electrical stimulation  Planned therapy interventions: abdominal trunk stabilization, activity modification, ADL retraining, balance/weight bearing training, breathing training, body mechanics training, coordination, flexibility, functional ROM exercises, graded exercise, home exercise program, work reintegration, therapeutic training, therapeutic exercise, therapeutic activities, stretching, strengthening, self care, postural training, patient education, neuromuscular re-education, muscle pump exercises, motor coordination training, Fink taping, manual therapy, joint mobilization, IADL retraining, balance and gait training  Frequency: 2x week  Duration in visits: 16  Duration in weeks: 8  Treatment plan discussed with: patient, PTA and referring physician        Subjective Evaluation    Pain  Current pain ratin  At best pain ratin  At worst pain ratin  Location: low back pain    Patient Goals  Patient goal: He wants to be able to return to work and go about his normal life  WORK:  Patient is a  at [employer]  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  He does not do any heavy lifting  He does have to do a lot of bending  He has to do a lot of walking (80% of the day)  He is off work until he is released from his work doctor  He reports that he thinks it will be mid January  HOME LIFE: He lives with his wife  He has two poodles and a cat  He is not lifting the dogs or the cat  Around the house he helps out around the house with pretty much everything  He cleans the horse stalls, takes out the garbage, all  stuff, he helps with all cooking, cleaning, etc   [place]  HOBBIES/EXERCISE:  He gets his exercise cleaning the horse stalls  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  PAIN LOCATION/DESCRIPTORS:  He reports that he is not having any pain  He has no pain in his legs, and reports that the only pain he has is at the incision  He still has stitches in      AGGRAVATING FACTORS:  Bending, using UE from sit to stand, sitting for >30 mins, laying for too long  He is sleeping in bed, but it is bothersome  Avoiding: lifting, cleaning the horse stalls, driving,   EASES: Meds, walking, back brace helps, but aggravates the incision  Has been leaving it off for this reason  LATENCY: 5 mins  DAY PATTERN:  No change throughout the day  Maybe worse at night because he is trying to be still at night in bed  IMAGIN19: everything looked great  PLOF:  Prior to all this he had back and leg pain for >2 years  HISTORY OF CURRENT INJURY:  He reports that he had pain in the left leg 2 years ago, and he had a surgery to relieve the left side compression  This surgery was on 19 because the right leg was giving him issues  He reports that this surgery went well, no complications  He had a 2 night stay in the hospital   He went straight home, and did not have any PT since  He has stitches still in the incision, states they will come out this week, but they were left in because he continues to have some drainage  Objective     Concurrent Complaints  Positive for disturbed sleep  Negative for night pain, bladder dysfunction, bowel dysfunction and saddle (S4) numbness    Neurological Testing     Additional Neurological Details  Unable to elicit reflexes secondary to patient not being able to relax  Neuro Screen of the [place]:     Dermatomes: equal and symmetrical bilaterally throughout  Myotomes: equal and symmetrical bilaterally throughout, with exception of DF            Active Range of Motion     Additional Active Range of Motion Details  Not tested secondary to recent surgery      Strength/Myotome Testing     Left Hip   Planes of Motion   Flexion: 3+  Abduction: 3+  Adduction: 3+    Right Hip   Planes of Motion   Flexion: 3+  Abduction: 3+  Adduction: 3+    Left Knee   Extension: 5    Right Knee   Extension: 5    Left Ankle/Foot   Dorsiflexion: 5  Plantar flexion: 3+  Great toe extension: 5    Right Ankle/Foot   Dorsiflexion: 4  Plantar flexion: 3+  Great toe extension: 4+    Muscle Activation   Patient unable to activate left transverse abdominals, left multifidus, right transverse abdominals and right multifidus  Diagnosis: LS fusion L2-4   Precautions: No bending, lifting, twisting  Neutral spine protocol  Manual Therapy 12/9/19       STM paraspinals        HS stretch                                Exercise Diary         TrA act [id number] ladders      Glut set 10x10 sec ladders      Kegels [id number] ladders      Diaphragmatic breathing 20x       Hip Add with bolster        QS        Bridge        HS stretch with LS ext        R   Bike                                                                                                Modalities        CP PRN, IFC after incision is healed

## 2019-12-10 ENCOUNTER — OFFICE VISIT (OUTPATIENT)
Dept: PHYSICAL THERAPY | Facility: CLINIC | Age: 59
End: 2019-12-10
Payer: COMMERCIAL

## 2019-12-10 DIAGNOSIS — Z98.1 S/P LUMBAR FUSION: Primary | ICD-10-CM

## 2019-12-10 PROCEDURE — 97140 MANUAL THERAPY 1/> REGIONS: CPT

## 2019-12-10 PROCEDURE — 97112 NEUROMUSCULAR REEDUCATION: CPT

## 2019-12-10 PROCEDURE — 97110 THERAPEUTIC EXERCISES: CPT

## 2019-12-10 NOTE — PROGRESS NOTES
Daily Note     Today's date: 12/10/2019  Patient name: Vicky Hou  : 1960  MRN: [id number]  Referring provider: Esau Oviedo MD  Dx:   Encounter Diagnosis     ICD-10-CM    1  S/P lumbar fusion Z98 1                   Subjective: Pt reports that he is doing well  He states that he is hoping to get his stiches out tomorrow  Objective: See treatment diary below      Assessment: Tolerated treatment well  Pt able to perform progressions and new exercises with no complaints of increased sx's  Tenderness is noted in LB at incision site with light pressure  With positioning pt was able to tolerate lying supine with no complaints  Reviewed HEP  Pt will benefit from continued therapy  Plan: Continue per plan of care     Diagnosis: LS fusion L2-4   Precautions: No bending, lifting, twisting  Neutral spine protocol  Manual Therapy 12/9/19 12/10/19      STM paraspinals        HS stretch  TJH, PTA                              Exercise Diary         TrA act [id number] Ladders: 10x      Glut set 10x10 sec Ladders: 10x      Kegels [id number] Ladders: 10x      Diaphragmatic breathing 20x 20x      Hip Add with bolster  15x5 sec      QS  10x10 sec      Bridge  15x      HS stretch with LS ext  3x30 sec      R   Bike  5 mins                                                                              Education  Review HEP              Modalities        CP PRN, IFC after incision is healed

## 2019-12-11 ENCOUNTER — OFFICE VISIT (OUTPATIENT)
Dept: OBGYN CLINIC | Facility: CLINIC | Age: 59
End: 2019-12-11

## 2019-12-11 VITALS
HEART RATE: 85 BPM | WEIGHT: 169 LBS | BODY MASS INDEX: 24.2 KG/M2 | DIASTOLIC BLOOD PRESSURE: 75 MMHG | SYSTOLIC BLOOD PRESSURE: 136 MMHG | HEIGHT: 70 IN

## 2019-12-11 DIAGNOSIS — Z98.1 S/P LUMBAR FUSION: Primary | ICD-10-CM

## 2019-12-11 PROCEDURE — 99024 POSTOP FOLLOW-UP VISIT: CPT | Performed by: ORTHOPAEDIC SURGERY

## 2019-12-11 RX ORDER — CEPHALEXIN 500 MG/1
500 CAPSULE ORAL EVERY 6 HOURS SCHEDULED
Qty: 20 CAPSULE | Refills: 0 | Status: SHIPPED | OUTPATIENT
Start: 2019-12-11 | End: 2019-12-16

## 2019-12-11 NOTE — PROGRESS NOTES
Assessment/Plan:    Status post revision posterolateral lumbar fusion L3-5 on 11/21/2019  Patient notes almost complete resolution of all drainage from his lumbar wound  Denies any fevers chills or constitutional symptoms  On physical exam his wound demonstrates minimal serous spotting from the center  No dehiscence no fluctuance no erythema no warmth  · Continue Keflex for an additional 5 days  · Maintain precautions  · Follow up 2 weeks       Problem List Items Addressed This Visit        Other    S/P lumbar fusion - Primary            Subjective:      Patient ID: Elvira Pearce is a 62 y o  male  HPI  Alicia Rangel is a 62year old male who presents to the office status post revision posterolateral lumbar fusion L3-5 on 11/21/2019  He reports doing well overall  He is experiencing low back and bilateral hip soreness  He reports improved pre-operative right lower extremity symptoms  He denies any recent fevers, chills or drainage  He has no new complaints or concerns today in the office  The following portions of the patient's history were reviewed and updated as appropriate: current medications, past family history, past medical history, past social history, past surgical history and problem list     Review of Systems   Constitutional: Negative for fever and unexpected weight change  HENT: Negative for hearing loss, nosebleeds and sore throat  Eyes: Negative for pain, redness and visual disturbance  Respiratory: Negative for cough, shortness of breath and wheezing  Cardiovascular: Negative for chest pain, palpitations and leg swelling  Gastrointestinal: Negative for abdominal pain, nausea and vomiting  Endocrine: Negative for polydipsia and polyuria  Genitourinary: Negative for dysuria and hematuria  Skin: Negative for rash and wound  Neurological: Negative for dizziness and headaches  Psychiatric/Behavioral: Negative for agitation and suicidal ideas           Objective:      /75 Pulse 85   Ht 5' 10" (1 778 m)   Wt 76 7 kg (169 lb)   BMI 24 25 kg/m²          Physical Exam   Constitutional: He is oriented to person, place, and time  He appears well-developed and well-nourished  HENT:   Head: Normocephalic and atraumatic  Eyes: Pupils are equal, round, and reactive to light  Neck: Neck supple  Cardiovascular: Intact distal pulses  Pulmonary/Chest: Breath sounds normal    Neurological: He is alert and oriented to person, place, and time  Skin: Skin is warm and dry  Psychiatric: He has a normal mood and affect  His behavior is normal        Patient ambulates without assistance  Incision clean, dry and intact  Sutures removed today    No drainage  Neurologically stable    Scribe Attestation    I,:    am acting as a scribe while in the presence of the attending physician :        I,:    personally performed the services described in this documentation    as scribed in my presence :

## 2019-12-17 ENCOUNTER — OFFICE VISIT (OUTPATIENT)
Dept: PHYSICAL THERAPY | Facility: CLINIC | Age: 59
End: 2019-12-17
Payer: COMMERCIAL

## 2019-12-17 DIAGNOSIS — Z98.1 S/P LUMBAR FUSION: Primary | ICD-10-CM

## 2019-12-17 PROCEDURE — 97140 MANUAL THERAPY 1/> REGIONS: CPT

## 2019-12-17 PROCEDURE — 97110 THERAPEUTIC EXERCISES: CPT

## 2019-12-17 PROCEDURE — 97112 NEUROMUSCULAR REEDUCATION: CPT

## 2019-12-17 NOTE — PROGRESS NOTES
Daily Note     Today's date: 2019  Patient name: Samina Aguilar  : 1960  MRN: [id number]  Referring provider: Jason Bustos MD  Dx:   Encounter Diagnosis     ICD-10-CM    1  S/P lumbar fusion Z98 1                   Subjective: Pt states that he had his stitches removed last week and will follow up with the surgeon again in 2 weeks  He reports no pain, just some sensitivity at the top of his incision still  Objective: See treatment diary below      Assessment: Tolerated treatment well  Pt able to tolerate progressions in strengthening with no complaints of increased pain or difficulty  Cues required for technique/form, especially mini squats  Pt reports decreased tightness and feeling looser after manual stretching  Pt progressing slowly towards his long term goals  Plan: Continue per plan of care     Diagnosis: LS fusion L2-4   Precautions: No bending, lifting, twisting  Neutral spine protocol  Manual Therapy 12/9/19 12/10/19 12/17/19     STM paraspinals        HS stretch  TJH, PTA TJH, PTA                             Exercise Diary         TrA act [id number] Ladders: 10x Ladders: 10x     Glut set 10x10 sec Ladders: 10x Ladders: 10x     Kegels [id number] Ladders: 10x Ladders: 10x     Diaphragmatic breathing 20x 20x 20x     Hip Add with bolster  15x5 sec 15x5 sec     QS  10x10 sec Ladders:     Bridge  15x 20x     HS stretch with LS ext  3x30 sec 3x30 sec     R   Bike  5 mins 5 mins     Wobble board   2 mins ea     HR/TR   20x ea     Mini squats   15x     Side steps   2 laps at mirror     Hip flexor stretch   3x30 sec                                     Education  Review HEP              Modalities        CP PRN, IFC after incision is healed

## 2019-12-19 ENCOUNTER — OFFICE VISIT (OUTPATIENT)
Dept: PHYSICAL THERAPY | Facility: CLINIC | Age: 59
End: 2019-12-19
Payer: COMMERCIAL

## 2019-12-19 DIAGNOSIS — Z98.1 S/P LUMBAR FUSION: Primary | ICD-10-CM

## 2019-12-19 PROCEDURE — 97110 THERAPEUTIC EXERCISES: CPT

## 2019-12-19 PROCEDURE — 97140 MANUAL THERAPY 1/> REGIONS: CPT

## 2019-12-19 PROCEDURE — 97112 NEUROMUSCULAR REEDUCATION: CPT

## 2019-12-19 NOTE — PROGRESS NOTES
Daily Note     Today's date: 2019  Patient name: Tayla Small  : 1960  MRN: [id number]  Referring provider: Antonella Mccormick MD  Dx:   Encounter Diagnosis     ICD-10-CM    1  S/P lumbar fusion Z98 1                   Subjective: Pt states that he is doing good  He has no complaints of having difficulty with anything right now  Objective: See treatment diary below      Assessment: Tolerated treatment well  Pt able to perform progressions and increased reps and resistance with no complaints of pain  Fatigue is noted with progressions  Pt limited with HS stretch and benefits from manual stretching to improve hamstring length  Pt progressing nicely towards his long term goals        Plan: Continue per plan of care     Diagnosis: LS fusion L2-4   Precautions: No bending, lifting, twisting  Neutral spine protocol  Manual Therapy 12/9/19 12/10/19 12/17/19 12/19/19    STM paraspinals        HS stretch  TJH, PTA TJH, PTA TJH, PTA                            Exercise Diary         TrA act [id number] Ladders: 10x Ladders: 10x Ladders: 10x    Glut set 10x10 sec Ladders: 10x Ladders: 10x Ladders: 10x    Kegels [id number] Ladders: 10x Ladders: 10x Ladders: 10x    Diaphragmatic breathing 20x 20x 20x     Hip Add with bolster  15x5 sec 15x5 sec 15x5 sec    QS  10x10 sec Ladders: Ladders: 10x    Bridge  15x 20x 20x    HS stretch with LS ext  3x30 sec 3x30 sec 3x30 sec    R   Bike  5 mins 5 mins 5 mins    Wobble board   2 mins ea 2 mins ea    HR/TR   20x ea 20x ea    Mini squats   15x 20x    Side steps   2 laps at mirror     Hip flexor stretch   3x30 sec 3x30 sec    Step ups    6" 10x ea                            Education  Review HEP              Modalities        CP PRN, IFC after incision is healed

## 2019-12-23 ENCOUNTER — OFFICE VISIT (OUTPATIENT)
Dept: PHYSICAL THERAPY | Facility: CLINIC | Age: 59
End: 2019-12-23
Payer: COMMERCIAL

## 2019-12-23 DIAGNOSIS — Z98.1 S/P LUMBAR FUSION: Primary | ICD-10-CM

## 2019-12-23 PROCEDURE — 97112 NEUROMUSCULAR REEDUCATION: CPT

## 2019-12-23 PROCEDURE — 97140 MANUAL THERAPY 1/> REGIONS: CPT

## 2019-12-23 PROCEDURE — 97110 THERAPEUTIC EXERCISES: CPT

## 2019-12-23 NOTE — PROGRESS NOTES
Daily Note     Today's date: 2019  Patient name: Luis Zamudio  : 1960  MRN: [id number]  Referring provider: Judit Montes MD  Dx:   Encounter Diagnosis     ICD-10-CM    1  S/P lumbar fusion Z98 1                   Subjective: Pt states that he is doing well and has no real complaints  Objective: See treatment diary below      Assessment: Tolerated treatment well  Pt able to perform progressions with no increase in pain  Muscle fatigue is noted with progressions  Continued cues needed for proper form of mini squats  Pt progressing nicely towards his long term goals  Plan: Continue per plan of care     Diagnosis: LS fusion L2-4   Precautions: No bending, lifting, twisting  Neutral spine protocol  Manual Therapy 12/9/19 12/10/19 12/17/19 12/19/19 12/23/19   STM paraspinals        HS stretch  TJH, PTA TJH, PTA TJH, PTA TJH, PTA                           Exercise Diary         TrA act [id number] Ladders: 10x Ladders: 10x Ladders: 10x Ladders:10x   Glut set 10x10 sec Ladders: 10x Ladders: 10x Ladders: 10x    Kegels [id number] Ladders: 10x Ladders: 10x Ladders: 10x    Diaphragmatic breathing 20x 20x 20x     Hip Add with bolster  15x5 sec 15x5 sec 15x5 sec    QS  10x10 sec Ladders: Ladders: 10x    Bridge  15x 20x 20x On SB 15x   HS stretch with LS ext  3x30 sec 3x30 sec 3x30 sec 3x30 sec   R   Bike  5 mins 5 mins 5 mins 6 mins   Wobble board   2 mins ea 2 mins ea 2 mins ea   HR/TR   20x ea 20x ea 20x ea   Mini squats   15x 20x 20x   Side steps   2 laps at mirror  X-walks RTB 1x   Hip flexor stretch   3x30 sec 3x30 sec 3x30 sec   Step ups    6" 10x ea 6" 15x ea   SLB     3x30 sec ea                   Education  Review HEP              Modalities        CP PRN, IFC after incision is healed

## 2019-12-26 ENCOUNTER — OFFICE VISIT (OUTPATIENT)
Dept: PHYSICAL THERAPY | Facility: CLINIC | Age: 59
End: 2019-12-26
Payer: COMMERCIAL

## 2019-12-26 DIAGNOSIS — Z98.1 S/P LUMBAR FUSION: Primary | ICD-10-CM

## 2019-12-26 PROCEDURE — 97140 MANUAL THERAPY 1/> REGIONS: CPT

## 2019-12-26 PROCEDURE — 97112 NEUROMUSCULAR REEDUCATION: CPT

## 2019-12-26 PROCEDURE — 97110 THERAPEUTIC EXERCISES: CPT

## 2019-12-26 NOTE — PROGRESS NOTES
Daily Note     Today's date: 2019  Patient name: vYette Stokes  : 1960  MRN: [id number]  Referring provider: Shashi Joiner MD  Dx:   Encounter Diagnosis     ICD-10-CM    1  S/P lumbar fusion Z98 1                   Subjective: Pt states that he is doing well and has no complaints  Objective: See treatment diary below      Assessment: Tolerated treatment well  Pt able to perform progressions with no complaints of increase in pain  He is challenged with SB bridges and x-walks and demonstrates muscle fatigue  Pt progressing nicely towards his long term goals  Plan: Continue per plan of care     Diagnosis: LS fusion L2-4   Precautions: No bending, lifting, twisting  Neutral spine protocol  Manual Therapy 19   STM paraspinals        HS stretch TJH, PTA  TJH, PTA TJH, PTA TJH, PTA                           Exercise Diary         TrA act ladders: standing 10x  Ladders: 10x Ladders: 10x Ladders:10x   Glut set Ladders 10x  Ladders: 10x Ladders: 10x    Kegels ladders 10x  Ladders: 10x Ladders: 10x    Diaphragmatic breathing   20x     Hip Add with bolster   15x5 sec 15x5 sec    QS   Ladders: Ladders: 10x    Bridge on SB 20x  20x 20x On SB 15x   HS stretch with LS ext 3x30 sec  3x30 sec 3x30 sec 3x30 sec   R   Bike 6 mins  5 mins 5 mins 6 mins   Wobble board 2 mins  2 mins ea 2 mins ea 2 mins ea   HR/TR   20x ea 20x ea 20x ea   Mini squats 20x  15x 20x 20x   X-walks RTB 2x  2 laps at [employer] RTB 1x   Hip flexor stretch 3x30 sec  3x30 sec 3x30 sec 3x30 sec   Step ups  With TrA 6" 20x ea   6" 10x ea 6" 15x ea   SLB 3x30 sec ea    3x30 sec ea   Tandem walking 2 laps On foam              Education Review HEP               Modalities        CP PRN, IFC after incision is healed

## 2019-12-30 ENCOUNTER — OFFICE VISIT (OUTPATIENT)
Dept: OBGYN CLINIC | Facility: HOSPITAL | Age: 59
End: 2019-12-30

## 2019-12-30 ENCOUNTER — OFFICE VISIT (OUTPATIENT)
Dept: PHYSICAL THERAPY | Facility: CLINIC | Age: 59
End: 2019-12-30
Payer: COMMERCIAL

## 2019-12-30 ENCOUNTER — HOSPITAL ENCOUNTER (OUTPATIENT)
Dept: RADIOLOGY | Facility: HOSPITAL | Age: 59
Discharge: HOME | End: 2019-12-30
Attending: ORTHOPAEDIC SURGERY
Payer: COMMERCIAL

## 2019-12-30 VITALS
HEIGHT: 70 IN | SYSTOLIC BLOOD PRESSURE: 131 MMHG | WEIGHT: 169 LBS | BODY MASS INDEX: 24.2 KG/M2 | DIASTOLIC BLOOD PRESSURE: 81 MMHG | HEART RATE: 98 BPM

## 2019-12-30 DIAGNOSIS — Z98.1 S/P LUMBAR FUSION: ICD-10-CM

## 2019-12-30 DIAGNOSIS — Z98.1 S/P LUMBAR FUSION: Primary | ICD-10-CM

## 2019-12-30 DIAGNOSIS — Z48.89 AFTERCARE FOLLOWING SURGERY: ICD-10-CM

## 2019-12-30 PROCEDURE — 97110 THERAPEUTIC EXERCISES: CPT

## 2019-12-30 PROCEDURE — 97112 NEUROMUSCULAR REEDUCATION: CPT

## 2019-12-30 PROCEDURE — 72100 X-RAY EXAM L-S SPINE 2/3 VWS: CPT

## 2019-12-30 PROCEDURE — 97140 MANUAL THERAPY 1/> REGIONS: CPT

## 2019-12-30 PROCEDURE — 99024 POSTOP FOLLOW-UP VISIT: CPT | Performed by: ORTHOPAEDIC SURGERY

## 2019-12-30 NOTE — PROGRESS NOTES
Assessment/Plan:    Patient seen and examined with Dr Levi Rosado  He is now roughly six weeks status post revision decompression L3-5, instrumented posterior lateral fusion with interbody fusion L3-4 L4-5 done on 11/21/2019  Overall he is doing very well and notes almost complete resolution of preop right lower extremity radicular symptoms  He has minimal low back pain or soreness  He is very happy with the results of the procedure  X-rays today show stable instrumentation, in good alignment  From our standpoint he should continue LSO brace when out of bed  Maintain lumbar spine precautions  Continue physical therapy for lower extremity strengthening and core and lower back stretching and strengthening  Follow up in six weeks for re-evaluation and new x-rays of the lumbar spine  Any questions or issues in the interim, feel free to call the office  Work note provided today  Problem List Items Addressed This Visit        Other    S/P lumbar fusion - Primary    Relevant Orders    XR spine lumbar 2 or 3 views injury    Aftercare following surgery            Subjective:      Patient ID: Kelsey Bower is a 62 y o  male  HPI     The patient is a 70-year-old male presents for a postop visit  He is now roughly six weeks status post revision decompression L3-5, instrumented posterior lateral fusion with interbody fusion L3-4 L4-5 done on 11/21/2019  Today, he states he continues to do very well and notes almost complete resolution of preop right lower extremity radicular symptoms  He has minimal low back pain  He gets occasional lower back pain and soreness after physical therapy but it subsides  He is happy with the results of the procedure  He denies any significant issues at today's visit      The following portions of the patient's history were reviewed and updated as appropriate: allergies, current medications, past family history, past medical history, past social history, past surgical history and problem list     Review of Systems      Objective:      /81   Pulse 98   Ht 5' 10" (1 778 m)   Wt 76 7 kg (169 lb)   BMI 24 25 kg/m²          Physical Exam      No acute distress  Gait is normal without assistance  Inspection reveals open wounds or erythema  Lumbar incision is healed  Strength is 5/5 L2-S1 bilaterally, sensation is equal intact

## 2019-12-30 NOTE — LETTER
December 30, 2019     Patient: Johny Manriquez   YOB: 1960   Date of Visit: 12/30/2019       To Whom it May Concern:    Johny Manriquez is under my professional care  He was seen in my office on 12/30/2019  He is to remain out of work until his next follow-up appointment in six weeks  Further work instructions will be provided at that time  If you have any questions or concerns, please don't hesitate to call           Sincerely,          Wei Cruz MD        CC: No Recipients

## 2019-12-30 NOTE — PROGRESS NOTES
Daily Note     Today's date: 2019  Patient name: Tony Lemus  : 1960  MRN: [id number]  Referring provider: Bradley Morris MD  Dx:   Encounter Diagnosis     ICD-10-CM    1  S/P lumbar fusion Z98 1                   Subjective: Pt states that he is doing great  He has no complaints of increased pain with any activities  Objective: See treatment diary below      Assessment: Tolerated treatment well  Pt is able to perform progressions with no complaints of increased sx's  He is challenged with progressions in strengthening, with muscle fatigue noted  Cues are required for proper form and technique of squat  He is able to correct with cues  Pt is able to perform HS stretch with less tension noted  He is progressing slowly towards his long term goals  Plan: Continue per plan of care     Diagnosis: LS fusion L2-4   Precautions: No bending, lifting, twisting  Neutral spine protocol  Manual Therapy 19   STM paraspinals        HS stretch TJH, PTA TJH, PTA  TJH, PTA TJH, PTA                           Exercise Diary         TrA act ladders: standing 10x   Ladders: 10x Ladders:10x   Glut set Ladders 10x   Ladders: 10x    Kegels ladders 10x   Ladders: 10x    Diaphragmatic breathing        Hip Add with bolster    15x5 sec    QS    Ladders: 10x    Bridge on SB 20x Straight and bent 15x ea  20x On SB 15x   HS stretch with LS ext 3x30 sec 3x30 sec  3x30 sec 3x30 sec   R   Bike 6 mins 7 mins  5 mins 6 mins   Wobble board 2 mins 2 mins ea  2 mins ea 2 mins ea   HR/TR    20x ea 20x ea   Mini squats 20x On airex 20x  20x 20x   X-walks RTB 2x RTB 2x   X-walks RTB 1x   Hip flexor stretch 3x30 sec 3x30 sec  3x30 sec 3x30 sec   Step ups  With TrA 6" 20x ea 8" 15x ea  6" 10x ea 6" 15x ea   SLB 3x30 sec ea 3x30 sec ea on foam   3x30 sec ea   Tandem walking 2 laps On foam              Education Review HEP               Modalities        CP PRN, IFC after incision is healed

## 2020-01-02 ENCOUNTER — OFFICE VISIT (OUTPATIENT)
Dept: PHYSICAL THERAPY | Facility: CLINIC | Age: 60
End: 2020-01-02
Payer: COMMERCIAL

## 2020-01-02 DIAGNOSIS — Z98.1 S/P LUMBAR FUSION: Primary | ICD-10-CM

## 2020-01-02 PROCEDURE — 97112 NEUROMUSCULAR REEDUCATION: CPT | Performed by: PHYSICAL THERAPIST

## 2020-01-02 PROCEDURE — 97530 THERAPEUTIC ACTIVITIES: CPT | Performed by: PHYSICAL THERAPIST

## 2020-01-02 PROCEDURE — 97110 THERAPEUTIC EXERCISES: CPT | Performed by: PHYSICAL THERAPIST

## 2020-01-02 NOTE — PROGRESS NOTES
Daily Note     Today's date: 2020  Patient name: David Bradshaw  : 1960  MRN: [id number]  Referring provider: Leonor Oro MD  Dx:   Encounter Diagnosis     ICD-10-CM    1  S/P lumbar fusion Z98 1                   Subjective: Patient reports no pain, and states he feels really good  He reports that he was seen by the referring physician earlier this week and notes that he was pleased with his progress  However, he did not mention If he still had lifting restrictions  He will be calling to ask this  Objective: See treatment diary below      Assessment: Tolerated treatment well  Patient exhibited good technique with therapeutic exercises  Patient required extensive cues to perform lifting without compensation and with the use of his LE  He is improving well toward long term goals  Had no pain with any exercises today  Manuals held secondary to likely discharge next visit  Plan: Continue per plan of care  Diagnosis: LS fusion L2-4   Precautions: No bending, lifting, twisting  Neutral spine protocol  Manual Therapy 19   STM paraspinals        HS stretch TJH, PTA TJH, PTA hold  TJH, PTA                           Exercise Diary         QS   TrA +SLR: 20x ea     Bridge on SB 20x Straight and bent 15x ea Triple Threats: 15x ea  On SB 15x   HS stretch with LS ext 3x30 sec 3x30 sec [id number]  3x30 sec   R  Bike 6 mins 7 mins 5 mins  6 mins   Wobble board 2 mins 2 mins ea 2 mins ea  2 mins ea   Mini squats 20x On airex 20x Lifting mechanics with 5# ONLY from chair to waist: 10x with extensive vc's for proper form        20x   X-walks RTB 2x RTB 2x RTB 2x   X-walks RTB 1x   Hip flexor stretch 3x30 sec 3x30 sec Hip flexor stretch on step:  [id number] ea  3x30 sec   Step ups  With TrA 6" 20x ea 8" 15x ea 8in 15x ea  6" 15x ea   SLB 3x30 sec ea 3x30 sec ea on foam SLB with TrA/multifidus act without HHA: 3x30 sec  3x30 sec ea   Tandem walking 2 laps On foam Tandem walking, on airex, fwd and backward: 2 laps             Education Review HEP  POC, and dc likely             Modalities        CP PRN, IFC after incision is healed

## 2020-01-07 ENCOUNTER — EVALUATION (OUTPATIENT)
Dept: PHYSICAL THERAPY | Facility: CLINIC | Age: 60
End: 2020-01-07
Payer: COMMERCIAL

## 2020-01-07 DIAGNOSIS — Z98.1 S/P LUMBAR FUSION: Primary | ICD-10-CM

## 2020-01-07 PROCEDURE — 97112 NEUROMUSCULAR REEDUCATION: CPT | Performed by: PHYSICAL THERAPIST

## 2020-01-07 PROCEDURE — 97110 THERAPEUTIC EXERCISES: CPT

## 2020-01-07 NOTE — PROGRESS NOTES
PT Discharge    Today's date: 2020  Patient name: Herrera Elizabeth  : 1960  MRN: [id number]  Referring provider: David Langley MD  Dx:   Encounter Diagnosis     ICD-10-CM    1  S/P lumbar fusion Z98 1                   Assessment  Assessment details: Herrera Elizabeth has been compliant with attending PT and home exercise program since initial eval   Vivi Angus  has made improvements in objective data since initial evalulation and has achieved all goals  Patient reports having returned to their prior level of function  Patient provided with updated Home Exercise Program, all questions answered, and verbalized understanding, agreeing to plan of care  Thus it was mutually decided to discontinue this episode of care and transition to Home Exercise Program     Understanding of Dx/Px/POC: good   Prognosis: good    Goals  Impairment Goals 4-6 weeks  - Decrease pain to <3/10 -MET  - Improve lumbar AROM to >75% throughout -MET  - Increase hip strength to 4/5 throughout -MEt  - Increase core strength to be able to sit up with minimal pain -MET    Functional Goals 6-8 weeks  - Return to Prior Level of Function -MET  - Increase Functional Status Measure (FOTO) to: >75 -MET  - Patient will be independent with HEP -MET  - Patient will be able to sit without increased pain/compensation/difficulty -MET  - Patient will be able to perform sit to stand without increased pain/compensation/difficulty  -MET  - Patient will be able to bend forward without increased pain/compensation/difficulty -MET  - Patient will be able to lift >15 pounds with proper mechanics and no pain -MET      Plan  Referral necessary: No  Planned therapy interventions: home exercise program  Treatment plan discussed with: patient        Subjective Evaluation    Pain  Current pain ratin  At best pain ratin  At worst pain ratin  Location: low back pain    Patient Goals  Patient goal: He wants to be able to return to work and go about his normal life  -MET        Patient reports that he feels about 100% improved since starting PT  He reports that he does not have a back to work date  He sees the surgeon on 2/12 for re-evaluation  PAIN LOCATION/DESCRIPTORS:  He reports that he is not having any pain  He reports no pain even at the incision site  AGGRAVATING FACTORS:    No longer notes difficulty with: Bending, sit to stand, sitting, laying  He is sleeping in bed  Driving  Avoiding: lifting, cleaning the horse stalls  Objective     Concurrent Complaints  Negative for night pain, disturbed sleep, bladder dysfunction, bowel dysfunction and saddle (S4) numbness    Neurological Testing     Additional Neurological Details  Neuro Screen of the [place]:     Dermatomes: equal and symmetrical bilaterally throughout  Myotomes: equal and symmetrical bilaterally throughout, with exception of DF  Reflexes: equal and symmetrical bilaterally            Active Range of Motion     Additional Active Range of Motion Details  LS AROM:   Flexion: 75%  Extension: 25%  SideBending right: 75%  SideBending left: 75%  Rotation right: 75%  Rotation left: 75%  Had no pain with any motion  Strength/Myotome Testing     Left Hip   Planes of Motion   Flexion: 4+  Abduction: 4+  Adduction: 4+    Right Hip   Planes of Motion   Flexion: 4+  Abduction: 4+  Adduction: 4+    Left Knee   Flexion: 5  Extension: 5    Right Knee   Flexion: 5  Extension: 5    Left Ankle/Foot   Dorsiflexion: 5  Plantar flexion: 5  Great toe extension: 5    Right Ankle/Foot   Dorsiflexion: 5  Plantar flexion: 5  Great toe extension: 5    Muscle Activation   Patient able to activate left transverse abdominals, left multifidus, right transverse abdominals and right multifidus  Flowsheet Rows      Most Recent Value   PT/OT G-Codes   Current Score  94   Projected Score  71                Diagnosis: LS fusion L2-4   Precautions: No bending, lifting, twisting  Neutral spine protocol     Manual Therapy 12/26/19 12/30/19 1/2/20 1/7/20    STM paraspinals        HS stretch TJH, PTA TJH, PTA hold     RE    HJS, PT                    Exercise Diary         QS   TrA +SLR: 20x ea     Bridge on SB 20x Straight and bent 15x ea Triple Threats: 15x ea     HS stretch with LS ext 3x30 sec 3x30 sec [id number]     R  Bike 6 mins 7 mins 5 mins     Wobble board 2 mins 2 mins ea 2 mins ea     Mini squats 20x On airex 20x Lifting mechanics with 5# ONLY from chair to waist: 10x with extensive vc's for proper form  X-walks RTB 2x RTB 2x RTB 2x      Hip flexor stretch 3x30 sec 3x30 sec Hip flexor stretch on step:  [id number] ea     Step ups  With TrA 6" 20x ea 8" 15x ea 8in 15x ea     SLB 3x30 sec ea 3x30 sec ea on foam SLB with TrA/multifidus act without HHA: 3x30 sec     Tandem walking 2 laps On foam Tandem walking, on airex, fwd and backward: 2 laps             Education Review HEP  POC, and dc likely HEP and POC discussed  Discharge instructions discussed                Modalities        CP PRN, IFC after incision is healed

## 2020-01-09 ENCOUNTER — APPOINTMENT (OUTPATIENT)
Dept: PHYSICAL THERAPY | Facility: CLINIC | Age: 60
End: 2020-01-09
Payer: COMMERCIAL

## 2020-01-14 ENCOUNTER — APPOINTMENT (OUTPATIENT)
Dept: PHYSICAL THERAPY | Facility: CLINIC | Age: 60
End: 2020-01-14
Payer: COMMERCIAL

## 2020-01-16 ENCOUNTER — APPOINTMENT (OUTPATIENT)
Dept: PHYSICAL THERAPY | Facility: CLINIC | Age: 60
End: 2020-01-16
Payer: COMMERCIAL

## 2020-02-12 ENCOUNTER — OFFICE VISIT (OUTPATIENT)
Dept: OBGYN CLINIC | Facility: CLINIC | Age: 60
End: 2020-02-12

## 2020-02-12 ENCOUNTER — APPOINTMENT (OUTPATIENT)
Dept: RADIOLOGY | Facility: AMBULARY SURGERY CENTER | Age: 60
End: 2020-02-12
Payer: COMMERCIAL

## 2020-02-12 VITALS
SYSTOLIC BLOOD PRESSURE: 126 MMHG | HEART RATE: 74 BPM | DIASTOLIC BLOOD PRESSURE: 76 MMHG | HEIGHT: 70 IN | BODY MASS INDEX: 24.2 KG/M2 | WEIGHT: 169 LBS

## 2020-02-12 DIAGNOSIS — Z98.1 S/P LUMBAR FUSION: ICD-10-CM

## 2020-02-12 DIAGNOSIS — Z98.1 S/P LUMBAR FUSION: Primary | ICD-10-CM

## 2020-02-12 PROCEDURE — 72100 X-RAY EXAM L-S SPINE 2/3 VWS: CPT

## 2020-02-12 PROCEDURE — 99024 POSTOP FOLLOW-UP VISIT: CPT | Performed by: ORTHOPAEDIC SURGERY

## 2020-02-12 NOTE — LETTER
February 12, 2020     Patient: Juan Manuel Denny   YOB: 1960   Date of Visit: 2/12/2020       To Whom it May Concern:    Juan Manuel Denny is under my professional care  He was seen in my office on 2/12/2020  He may return to work without restrictions  If you have any questions or concerns, please don't hesitate to call           Sincerely,          John Elias MD

## 2020-02-12 NOTE — PROGRESS NOTES
Assessment/Plan:  Patient reports doing very well and has no complaints  He is neurologically stable and ambulates independently with a well-healed incision  Imaging demonstrates stable instrumentation  He will follow up in 3 months for re-evaluation and new x-rays  He is allowed to return to work at this time  Status post revision decompression and posterolateral fusion L3-5 on 11/21/2019  · Avoid unnecessary and repeated heavy lifting  · Return to work note provided  · Follow up 3 months       Problem List Items Addressed This Visit        Other    S/P lumbar fusion - Primary    Relevant Orders    XR spine lumbar 2 or 3 views injury            Subjective:      Patient ID: Shad Drew is a 56 y o  male  HPI   Huber Harris is a 56year old male who presents to the office status post revision decompression and posterolateral fusion L3-5 on 11/21/2019  He reports doing well overall  He wishes to return to work at this time  He is happy with his progress thus far  He has no new complaints or concerns today in the office  The following portions of the patient's history were reviewed and updated as appropriate: current medications, past family history, past medical history, past social history, past surgical history and problem list     Review of Systems   Constitutional: Negative for fever and unexpected weight change  HENT: Negative for hearing loss, nosebleeds and sore throat  Eyes: Negative for pain, redness and visual disturbance  Respiratory: Negative for cough, shortness of breath and wheezing  Cardiovascular: Negative for chest pain, palpitations and leg swelling  Gastrointestinal: Negative for abdominal pain, nausea and vomiting  Endocrine: Negative for polydipsia and polyuria  Genitourinary: Negative for dysuria and hematuria  Skin: Negative for rash and wound  Neurological: Negative for dizziness and headaches  Psychiatric/Behavioral: Negative for agitation and suicidal ideas  Objective:      /76   Pulse 74   Ht 5' 10" (1 778 m)   Wt 76 7 kg (169 lb)   BMI 24 25 kg/m²          Physical Exam   Constitutional: He is oriented to person, place, and time  He appears well-developed and well-nourished  HENT:   Head: Normocephalic and atraumatic  Eyes: Pupils are equal, round, and reactive to light  Neck: Neck supple  Cardiovascular: Intact distal pulses  Pulmonary/Chest: Breath sounds normal    Neurological: He is alert and oriented to person, place, and time  Skin: Skin is warm and dry  Psychiatric: He has a normal mood and affect   His behavior is normal        Patient ambulates without assistance  Incision well healed  Strength L2-S1 5/5 bilaterally  Sensation L2-S1 intact bilaterally  Neurologically stable    Imaging  X-rays of lumbar spine 02/12/2020 were reviewed and shows stable hardware in good alignment    Scribe Attestation    I,:   Khurram Perez am acting as a scribe while in the presence of the attending physician :        I,:   Karin Mg MD personally performed the services described in this documentation    as scribed in my presence :

## 2020-08-26 ENCOUNTER — APPOINTMENT (OUTPATIENT)
Dept: RADIOLOGY | Facility: AMBULARY SURGERY CENTER | Age: 60
End: 2020-08-26
Payer: COMMERCIAL

## 2020-08-26 ENCOUNTER — OFFICE VISIT (OUTPATIENT)
Dept: OBGYN CLINIC | Facility: CLINIC | Age: 60
End: 2020-08-26
Payer: COMMERCIAL

## 2020-08-26 VITALS
WEIGHT: 163 LBS | HEIGHT: 70 IN | DIASTOLIC BLOOD PRESSURE: 92 MMHG | BODY MASS INDEX: 23.34 KG/M2 | SYSTOLIC BLOOD PRESSURE: 178 MMHG | HEART RATE: 67 BPM

## 2020-08-26 DIAGNOSIS — M43.16 SPONDYLOLISTHESIS, LUMBAR REGION: ICD-10-CM

## 2020-08-26 DIAGNOSIS — Z98.1 S/P LUMBAR FUSION: ICD-10-CM

## 2020-08-26 DIAGNOSIS — Z98.1 S/P LUMBAR FUSION: Primary | ICD-10-CM

## 2020-08-26 PROCEDURE — 99213 OFFICE O/P EST LOW 20 MIN: CPT | Performed by: ORTHOPAEDIC SURGERY

## 2020-08-26 PROCEDURE — 72100 X-RAY EXAM L-S SPINE 2/3 VWS: CPT

## 2020-08-26 NOTE — PROGRESS NOTES
Assessment:   Diagnosis ICD-10-CM Associated Orders   1  S/P lumbar fusion  Z98 1 XR spine lumbar 2 or 3 views injury   2  Spondylolisthesis, lumbar region  M43 16        Plan:  status post revision decompression and posterolateral fusion L3-5 on 11/21/2019 overall doing well  Imaging reviewed today shows stable fusion with intact hardware  He can resume normal activities  Avoid lifting anything heavy if possible,if he starts to experience back pain, back off  If any issues call  To do next visit:  No follow-ups on file  The above stated was discussed in layman's terms and the patient expressed understanding  All questions were answered to the patient's satisfaction  Scribe Attestation    I,:   Antonino Wiggins am acting as a scribe while in the presence of the attending physician :        I,:   Shantell Elliott MD personally performed the services described in this documentation    as scribed in my presence :              Subjective:   Abdullahi Patel is a 56 y o  male who presents status post revision decompression and posterolateral fusion L3-5 on 11/21/2019  He overall is doing very well, offers no new complaints at this time  He is doing his normal day to day activities with no pain  Denies weakness, numbness, tingling in legs  No loss of bowel or bladder  Review of systems negative unless otherwise specified in HPI    Past Medical History:   Diagnosis Date    Chronic pain     Hypertension     Lumbar disc disease        Past Surgical History:   Procedure Laterality Date    APPENDECTOMY      BACK SURGERY      HAND SURGERY Bilateral     RIGHT KNUCKLE REPLACED,  LEFT TRIGGER FINGER    LUMBAR FUSION N/A 11/21/2019    Procedure: Revision decompression L3-L5;  Instrumented posterior lateral fusion with interbody fusion L3-4, L4-5; allograft and neuromonitoring;  Surgeon: Shantell Elliott MD;  Location: [facility];  Service: Orthopedics    ME LAMINEC/FACETECT/FORAMIN,LUMBAR 1 SEG N/A 10/10/2017 Procedure: LUMBAR LAMINECTOMY, DECOMPRESSION L3-4,L4-5;  Surgeon: Ghada Caban MD;  Location: [facility];  Service: Orthopedics    RECTAL SURGERY         No family history on file      Social History     Occupational History    Not on file   Tobacco Use    Smoking status: Former Smoker    Smokeless tobacco: Never Used   Substance and Sexual Activity    Alcohol use: Yes     Frequency: 2-4 times a month     Drinks per session: 1 or 2     Binge frequency: Never    Drug use: No    Sexual activity: Not on file         Current Outpatient Medications:     amLODIPine (NORVASC) 5 mg tablet, , Disp: , Rfl:     aspirin (ECOTRIN LOW STRENGTH) 81 mg EC tablet, Take 81 mg by mouth daily, Disp: , Rfl:     gabapentin (NEURONTIN) 300 mg capsule, Take 1 capsule (300 mg total) by mouth daily at bedtime, Disp: 60 capsule, Rfl: 0    methocarbamol (ROBAXIN) 500 mg tablet, Take 1 tablet (500 mg total) by mouth 4 (four) times a day as needed for muscle spasms, Disp: 30 tablet, Rfl: 0    Omega-3 Fatty Acids (FISH OIL) 1200 MG CAPS, Take by mouth daily, Disp: , Rfl:     oxyCODONE (ROXICODONE) 5 mg immediate release tablet, Take 1 tablet (5 mg total) by mouth every 6 (six) hours as needed for severe painMax Daily Amount: 20 mg, Disp: 30 tablet, Rfl: 0    Saw Palmetto 160 MG TABS, Take by mouth daily, Disp: , Rfl:     telmisartan (MICARDIS) 80 MG tablet, , Disp: , Rfl:     traMADol (ULTRAM) 50 mg tablet, Take 1 tablet (50 mg total) by mouth every 6 (six) hours as needed for severe pain, Disp: 30 tablet, Rfl: 0    valsartan (DIOVAN) 320 MG tablet, Take 320 mg by mouth daily, Disp: , Rfl:     No Known Allergies         Vitals:    08/26/20 1310   BP: (!) 178/92   Pulse: 67       Objective:                    Back Exam     Comments:  Lumbar spine  No acute distress, well healed lumbar incision  Non tender to palpation  L2-S1 5/5 strength  L2-S1 L2-S1 sensation intact and symmetric             Diagnostics, reviewed and taken today if performed as documented: The attending physician has personally reviewed the pertinent films in PACS and interpretation is as follows: lumbar spine s/p lumbar fusion L3-5, stable hardware dodie and screw fixation with no lucency or fracture  Procedures, if performed today:    Procedures    None performed      Portions of the record may have been created with voice recognition software  Occasional wrong word or "sound a like" substitutions may have occurred due to the inherent limitations of voice recognition software  Read the chart carefully and recognize, using context, where substitutions have occurred

## 2023-11-09 NOTE — PLAN OF CARE
Problem: PAIN - ADULT  Goal: Verbalizes/displays adequate comfort level or baseline comfort level  Description  Interventions:  - Encourage patient to monitor pain and request assistance  - Assess pain using appropriate pain scale  - Administer analgesics based on type and severity of pain and evaluate response  - Implement non-pharmacological measures as appropriate and evaluate response  - Consider cultural and social influences on pain and pain management  - Notify physician/advanced practitioner if interventions unsuccessful or patient reports new pain  Outcome: Progressing     Problem: INFECTION - ADULT  Goal: Absence or prevention of progression during hospitalization  Description  INTERVENTIONS:  - Assess and monitor for signs and symptoms of infection  - Monitor lab/diagnostic results  - Monitor all insertion sites, i e  indwelling lines, tubes, and drains  - Monitor endotracheal if appropriate and nasal secretions for changes in amount and color  - [place] appropriate cooling/warming therapies per order  - Administer medications as ordered  - Instruct and encourage patient and family to use good hand hygiene technique  - Identify and instruct in appropriate isolation precautions for identified infection/condition  Outcome: Progressing  Goal: Absence of fever/infection during neutropenic period  Description  INTERVENTIONS:  - Monitor WBC    Outcome: Progressing     Problem: SAFETY ADULT  Goal: Patient will remain free of falls  Description  INTERVENTIONS:  - Assess patient frequently for physical needs  -  Identify cognitive and physical deficits and behaviors that affect risk of falls    -  [place] fall precautions as indicated by assessment   - Educate patient/family on patient safety including physical limitations  - Instruct patient to call for assistance with activity based on assessment  - Modify environment to reduce risk of injury  - Consider OT/PT consult to assist with d/c appt/Event Note strengthening/mobility  Outcome: Progressing

## 2024-11-13 ENCOUNTER — HOSPITAL ENCOUNTER (OUTPATIENT)
Dept: RADIOLOGY | Facility: HOSPITAL | Age: 64
Discharge: HOME | End: 2024-11-13
Attending: ORTHOPAEDIC SURGERY
Payer: MEDICARE

## 2024-11-13 VITALS
WEIGHT: 162.92 LBS | BODY MASS INDEX: 23.32 KG/M2 | HEIGHT: 70 IN | HEART RATE: 69 BPM | DIASTOLIC BLOOD PRESSURE: 80 MMHG | SYSTOLIC BLOOD PRESSURE: 173 MMHG

## 2024-11-13 DIAGNOSIS — R52 PAIN: ICD-10-CM

## 2024-11-13 DIAGNOSIS — R52 PAIN: Primary | ICD-10-CM

## 2024-11-13 PROCEDURE — 99203 OFFICE O/P NEW LOW 30 MIN: CPT | Performed by: ORTHOPAEDIC SURGERY

## 2024-11-13 PROCEDURE — 72110 X-RAY EXAM L-2 SPINE 4/>VWS: CPT

## 2024-11-13 RX ORDER — LOSARTAN POTASSIUM 100 MG/1
TABLET ORAL
COMMUNITY
Start: 2023-07-26

## 2024-11-13 NOTE — PROGRESS NOTES
"Assessment & Plan/Medical Decision Makin y.o. male with Back Pain and Left Radicular Leg Pain and imaging findings most notable for lumbar spondylosis , L3-5 fusion hardware        The clinical, physical and imaging findings were reviewed with the patient.  Nile  has a constellation of findings consistent with Lumbar Radiculopathy in the setting of lumbar degenerative disease.      Fortunately patient remains neurologically intact and functional. Physical exam showing no joni weakness, well healed posterior lumbar incision.  We discussed the treatment options including physical therapy, at home exercises, activity modifications, chiropractic medicine, oral medications, interventional spine procedures.  At this time recommend continued conservative treatments.    Given patient remains overall functional without neurologic deficits, would not recommend any treatment at this time. Continue activities as tolerated. If symptoms persist, change or worsen over the next month, will plan to obtain lumbar MRI for further evaluation.    Patient instructed to return to office/ER sooner if symptoms are not improving, getting worse, or new worrisome/neurologic symptoms arise.  Patient will follow up if symptoms persist.       Subjective:      Chief Complaint: Back Pain    HPI:  Nile Perez is a 64 y.o. male presenting for initial visit with chief complaint of back pain. PMH L3-5 laminectomy/decompression in 2017 for left sided symptoms that improved. However developed right sided symptom and underwent revision L3-5 decompression with instrumented fusion L3-4, L4-5 on 2019 with Dr. Honeycutt. Reports doing well after his revision surgery. However, reports recent onset of intermittent \"burning\" pain in his left lower back with tingling and \"cold\" sensation into left lower extremity that started about 1 month ago. He notes tingling is throughout his entire left lower extremity but is more-so in the front of his leg " to his knee. He denies any trauma. Denies right sided symptoms. His leg symptoms are relatively constant. He is overall very active and able to ambulate without much limitation. Denies joni lower extremity weakness but does feel like his gait is off. Denies any joni trauma. Denies fever or chills, no night sweats. Denies any bladder or bowel changes.      Denies heart or lung disease. Denies diabetes or kidney disease.    Conservative therapy includes the following:   Medications: OTC medication as needed    Injections: denies recent     Physical Therapy: denies recent  Chiropractic Medicine: has not attempted  Accupunture/Massage Therapy: has not attempted   These therapeutic modalities were ineffective at providing sustained pain relief/functional improvement.     Nicotine dependent: denies  Occupation: retired   Living situation: Lives with family   ADLs: patient is able to perform     Objective:     History reviewed. No pertinent family history.    Past Medical History:   Diagnosis Date    Chronic pain     Hypertension     Lumbar disc disease        Current Outpatient Medications   Medication Sig Dispense Refill    amLODIPine (NORVASC) 5 mg tablet       aspirin (ECOTRIN LOW STRENGTH) 81 mg EC tablet Take 81 mg by mouth daily      gabapentin (NEURONTIN) 300 mg capsule Take 1 capsule (300 mg total) by mouth daily at bedtime 60 capsule 0    methocarbamol (ROBAXIN) 500 mg tablet Take 1 tablet (500 mg total) by mouth 4 (four) times a day as needed for muscle spasms 30 tablet 0    Omega-3 Fatty Acids (FISH OIL) 1200 MG CAPS Take by mouth daily      oxyCODONE (ROXICODONE) 5 mg immediate release tablet Take 1 tablet (5 mg total) by mouth every 6 (six) hours as needed for severe painMax Daily Amount: 20 mg 30 tablet 0    Saw Palmetto 160 MG TABS Take by mouth daily      telmisartan (MICARDIS) 80 MG tablet       traMADol (ULTRAM) 50 mg tablet Take 1 tablet (50 mg total) by mouth every 6 (six) hours as  needed for severe pain 30 tablet 0    valsartan (DIOVAN) 320 MG tablet Take 320 mg by mouth daily       No current facility-administered medications for this visit.       Past Surgical History:   Procedure Laterality Date    APPENDECTOMY      BACK SURGERY      HAND SURGERY Bilateral     RIGHT KNUCKLE REPLACED,  LEFT TRIGGER FINGER    LUMBAR FUSION N/A 11/21/2019    Procedure: Revision decompression L3-L5; Instrumented posterior lateral fusion with interbody fusion L3-4, L4-5; allograft and neuromonitoring;  Surgeon: Margie Honeycutt MD;  Location: [facility];  Service: Orthopedics    DC LA[place]ENCE FACETECTOMY & FORAMOTOMY 1 VRT SGM LUMBAR N/A 10/10/2017    Procedure: LUMBAR LAMINECTOMY, DECOMPRESSION L3-4,L4-5;  Surgeon: Margie Honeycutt MD;  Location: [facility];  Service: Orthopedics    RECTAL SURGERY         Social History     Socioeconomic History    Marital status: /Civil Union     Spouse name: Not on file    Number of children: Not on file    Years of education: Not on file    Highest education level: Not on file   Occupational History    Not on file   Tobacco Use    Smoking status: Former    Smokeless tobacco: Never   Substance and Sexual Activity    Alcohol use: Yes    Drug use: No    Sexual activity: Not on file   Other Topics Concern    Not on file   Social History Narrative    Not on file     Social Drivers of Health     Financial Resource Strain: Not on file   Food Insecurity: Not on file   Transportation Needs: Not on file   Physical Activity: Not on file   Stress: Not on file   Social Connections: Not on file   Intimate Partner Violence: Not on file   Housing Stability: Not on file       No Known Allergies    Review of Systems  General- denies fever/chills  HEENT- denies hearing loss or sore throat  Eyes- denies eye pain or visual disturbances, denies red eyes  Respiratory- denies cough or SOB  Cardio- denies chest pain or palpitations  GI- denies abdominal pain  Endocrine- denies urinary  "frequency  Urinary- denies pain with urination  Musculoskeletal- Negative except noted above  Skin- denies rashes or wounds  Neurological- denies dizziness or headache  Psychiatric- denies anxiety or difficulty concentrating    Physical Exam  Ht 5' 10\" (1.778 m)   Wt 73.9 kg (162 lb 14.7 oz)   BMI 23.38 kg/m²     General/Constitutional: No apparent distress: well-nourished and well developed.  Lymphatic: No appreciable lymphadenopathy  Respiratory: Non-labored breathing  Vascular: No edema, swelling or tenderness, except as noted in detailed exam.  Integumentary: No impressive skin lesions present, except as noted in detailed exam.  Psych: Normal mood and affect, oriented to person, place and time.  MSK: normal other than stated in HPI and exam  Gait & balance: no evidence of myelopathic gait, ambulates Independently     Lumbar spine range of motion:  -Forward flexion to 90  -Extension to neutral  -Lateral bend 25 right, 25 left  -Rotation 25 right, 25 left  There tenderness with palpation along lumbar paraspinal musculature, no midline tenderness     Neurologic:  Lower Extremity Motor Function    Right  Left    Iliopsoas  5/5  5/5    Quadriceps 5/5 5/5   Tibialis anterior  5/5  5/5    EHL  5/5  5/5    Gastroc. muscle  5/5  5/5    Heel rise  5/5  5/5    Toe rise  5/5  5/5      Sensory: light touch is intact to bilateral upper and lower extremities     Reflexes:    Right Left   Patellar 1+ 1+   Achilles 1+ 1+   Babinski neg neg     Other tests:  Straight Leg Raise: negative  Kim SI: negative  PEEWEE SI: negative  Greater troch: no tenderness   Internal/external hip ROM: intact, no pain   Flexion/extension knee ROM: intact, no pain   Vascular: WWP extremities, 2+DP bilateral      Diagnostic Tests   IMAGING: I have personally reviewed the images and these are my findings:  Lumbar Spine X-rays from 11/13/2024: multi level lumbar spondylosis with loss of disc height, L3-5 posterior fusion hardware with interbody " "device appears stable,  osteophyte formation and facet hypertrophy, no apparent spondylolisthesis, no appreciated lytic/blastic lesions, no obvious instability    Electronic Medical Records were reviewed including office notes, orthopedic notes, imaging studies    Procedures, if performed today     None performed       Portions of the record may have been created with voice recognition software.  Occasional wrong word or \"sound a like\" substitutions may have occurred due to the inherent limitations of voice recognition software.  Read the chart carefully and recognize, using context, where substitutions have occurred.  "

## (undated) DEVICE — GLOVE SRG BIOGEL 8.5

## (undated) DEVICE — SWABSTCK, BENZOIN TINCTURE, 1/PK, STRL: Brand: APLICARE

## (undated) DEVICE — FLOSEAL HEMOSTATIC MATRIX, 10 ML: Brand: FLOSEAL

## (undated) DEVICE — FLOSEAL MATRIX IS INDICATED IN SURGICAL PROCEDURES (OTHER THAN IN OPHTHALMIC) AS AN ADJUNCT TO HEMOSTASIS WHEN CONTROL OF BLEEDING BY LIGATURE OR CONVENTIONAL PROCEDURES IS INEFFECTIVE OR IMPRACTICAL.: Brand: FLOSEAL HEMOSTATIC MATRIX

## (undated) DEVICE — JP 3-SPRING RES W/10FR PVC DRAIN/TR: Brand: CARDINAL HEALTH

## (undated) DEVICE — DRESSING MEPILEX BORDER 4 X 8 IN

## (undated) DEVICE — SPECIMEN CONTAINER STERILE PEEL PACK

## (undated) DEVICE — DRAPE C-ARM X-RAY

## (undated) DEVICE — PROXIMATE PLUS MD MULTI-DIRECTIONAL RELEASE SKIN STAPLERS CONTAINS 35 STAINLESS STEEL STAPLES APPROXIMATE CLOSED DIMENSIONS: 6.9MM X 3.9MM WIDE: Brand: PROXIMATE

## (undated) DEVICE — INTENDED FOR TISSUE SEPARATION, AND OTHER PROCEDURES THAT REQUIRE A SHARP SURGICAL BLADE TO PUNCTURE OR CUT.: Brand: BARD-PARKER SAFETY BLADES SIZE 10, STERILE

## (undated) DEVICE — DRAPE SHEET X-LG

## (undated) DEVICE — CHLORAPREP HI-LITE 26ML ORANGE

## (undated) DEVICE — DRAPE LAPAROTOMY W/POUCHES

## (undated) DEVICE — TOOL 14MH30 LEGEND 14CM 3MM: Brand: MIDAS REX ™

## (undated) DEVICE — DISPOSABLE EQUIPMENT COVER: Brand: SMALL TOWEL DRAPE

## (undated) DEVICE — SUT ETHILON 3-0 FS-1 18 IN 663G

## (undated) DEVICE — SUPPLY FEE STD

## (undated) DEVICE — GLOVE INDICATOR PI UNDERGLOVE SZ 8.5 BLUE

## (undated) DEVICE — SUT PDS II 0 CT-1 27 IN Z340H

## (undated) DEVICE — SUT VICRYL 2-0 CT-1 27 IN J259H

## (undated) DEVICE — INTENDED FOR TISSUE SEPARATION, AND OTHER PROCEDURES THAT REQUIRE A SHARP SURGICAL BLADE TO PUNCTURE OR CUT.: Brand: BARD-PARKER ® CARBON RIB-BACK BLADES

## (undated) DEVICE — 3M™ TEGADERM™ TRANSPARENT FILM DRESSING FRAME STYLE, 1624W, 2-3/8 IN X 2-3/4 IN (6 CM X 7 CM), 100/CT 4CT/CASE: Brand: 3M™ TEGADERM™

## (undated) DEVICE — BIPOLAR SEALER 23-113-1 AQM 2.3: Brand: AQUAMANTYS™

## (undated) DEVICE — UNIVERSAL SPINE,KIT: Brand: CARDINAL HEALTH

## (undated) DEVICE — LIGHT HANDLE COVER SLEEVE DISP BLUE STELLAR

## (undated) DEVICE — BETHLEHEM UNIVERSAL SPINE, KIT: Brand: CARDINAL HEALTH

## (undated) DEVICE — SPONGE PVP SCRUB WING STERILE

## (undated) DEVICE — 3M™ TEGADERM™ TRANSPARENT FILM DRESSING FRAME STYLE, 1626W, 4 IN X 4-3/4 IN (10 CM X 12 CM), 50/CT 4CT/CASE: Brand: 3M™ TEGADERM™

## (undated) DEVICE — DRAPE C-ARMOUR

## (undated) DEVICE — MODULAR TAP: Brand: XIA 3

## (undated) DEVICE — X-RAY DETECTABLE SPONGES,16 PLY: Brand: VISTEC

## (undated) DEVICE — JACKSON TABLE FOAM POSITIONING KIT: Brand: CARDINAL HEALTH

## (undated) DEVICE — SUT PDS II 0 CTX 60 IN Z990G

## (undated) DEVICE — DRAPE EQUIPMENT RF WAND

## (undated) DEVICE — 3M™ TEGADERM™ TRANSPARENT FILM DRESSING FRAME STYLE, 1628, 6 IN X 8 IN (15 CM X 20 CM), 10/CT 8CT/CASE: Brand: 3M™ TEGADERM™

## (undated) DEVICE — DURASEAL® EXACT SPINAL SEALANT SYSTEM 3ML 5 PACK: Brand: DURASEAL EXACT SPINAL SEALANT SYSTEM

## (undated) DEVICE — BOWL ASSY BM210 DUAL BLADE DISPOSABLE: Brand: MIDAS REX™

## (undated) DEVICE — SUT ETHILON 2-0 FSLX 30 IN 1674H

## (undated) DEVICE — Device

## (undated) DEVICE — NEEDLE 22 G X 1 1/2 SAFETY

## (undated) DEVICE — SUT VICRYL 0 CT-1 18 IN J740D

## (undated) DEVICE — DRESSING MEPILEX BORDER 4 X 10 IN

## (undated) DEVICE — PENCIL ELECTROSURG E-Z CLEAN -0035H

## (undated) DEVICE — GELFOAM 100

## (undated) DEVICE — FLOSEAL MATRIX IS INDICATED IN SURGICAL PROCEDURES (OTHER THAN IN OPHTHALMIC) AS AN ADJUNCT TO HEMOSTASIS WHEN CONTROL OF BLEEDING BY LIGATURE OR CONVENTIONALPROCEDURES IS INEFFECTIVE OR IMPRACTICAL.: Brand: FLOSEAL HEMOSTATIC MATRIX

## (undated) DEVICE — DRAPE SHEET THREE QUARTER

## (undated) DEVICE — MONITORING SPINAL IMPULSE CASE FEE

## (undated) DEVICE — LIGHT HANDLE COVER CAMERA DISP

## (undated) DEVICE — NEURO PATTIES 1/2 X 1 1/2

## (undated) DEVICE — SOFT SILICONE HYDROCELLULAR FOAM DRESSING WITH LOCK AWAY LAYER: Brand: ALLEVYN LIFE M 12.9X12.9 CTN10

## (undated) DEVICE — TRAY FOLEY 16FR URIMETER SURESTEP